# Patient Record
Sex: MALE | Race: OTHER | HISPANIC OR LATINO | URBAN - METROPOLITAN AREA
[De-identification: names, ages, dates, MRNs, and addresses within clinical notes are randomized per-mention and may not be internally consistent; named-entity substitution may affect disease eponyms.]

---

## 2017-07-13 ENCOUNTER — EMERGENCY (EMERGENCY)
Facility: HOSPITAL | Age: 42
LOS: 0 days | Discharge: HOME | End: 2017-07-14

## 2017-07-13 DIAGNOSIS — Z79.899 OTHER LONG TERM (CURRENT) DRUG THERAPY: ICD-10-CM

## 2017-07-13 DIAGNOSIS — R10.32 LEFT LOWER QUADRANT PAIN: ICD-10-CM

## 2017-07-13 DIAGNOSIS — K57.90 DIVERTICULOSIS OF INTESTINE, PART UNSPECIFIED, WITHOUT PERFORATION OR ABSCESS WITHOUT BLEEDING: ICD-10-CM

## 2017-07-13 DIAGNOSIS — E03.9 HYPOTHYROIDISM, UNSPECIFIED: ICD-10-CM

## 2017-08-25 ENCOUNTER — EMERGENCY (EMERGENCY)
Facility: HOSPITAL | Age: 42
LOS: 0 days | Discharge: HOME | End: 2017-08-25
Admitting: INTERNAL MEDICINE

## 2017-08-25 DIAGNOSIS — K57.92 DIVERTICULITIS OF INTESTINE, PART UNSPECIFIED, WITHOUT PERFORATION OR ABSCESS WITHOUT BLEEDING: ICD-10-CM

## 2017-08-25 DIAGNOSIS — R10.30 LOWER ABDOMINAL PAIN, UNSPECIFIED: ICD-10-CM

## 2017-08-25 DIAGNOSIS — E03.9 HYPOTHYROIDISM, UNSPECIFIED: ICD-10-CM

## 2017-08-25 DIAGNOSIS — Z87.891 PERSONAL HISTORY OF NICOTINE DEPENDENCE: ICD-10-CM

## 2017-08-25 DIAGNOSIS — K59.00 CONSTIPATION, UNSPECIFIED: ICD-10-CM

## 2017-08-25 DIAGNOSIS — Z79.899 OTHER LONG TERM (CURRENT) DRUG THERAPY: ICD-10-CM

## 2017-08-25 DIAGNOSIS — R10.32 LEFT LOWER QUADRANT PAIN: ICD-10-CM

## 2018-02-28 ENCOUNTER — EMERGENCY (EMERGENCY)
Facility: HOSPITAL | Age: 43
LOS: 0 days | Discharge: HOME | End: 2018-03-01
Attending: EMERGENCY MEDICINE | Admitting: INTERNAL MEDICINE

## 2018-02-28 VITALS
OXYGEN SATURATION: 96 % | WEIGHT: 210.1 LBS | TEMPERATURE: 101 F | RESPIRATION RATE: 20 BRPM | DIASTOLIC BLOOD PRESSURE: 73 MMHG | SYSTOLIC BLOOD PRESSURE: 129 MMHG | HEART RATE: 120 BPM

## 2018-02-28 DIAGNOSIS — Z79.899 OTHER LONG TERM (CURRENT) DRUG THERAPY: ICD-10-CM

## 2018-02-28 DIAGNOSIS — J20.9 ACUTE BRONCHITIS, UNSPECIFIED: ICD-10-CM

## 2018-02-28 DIAGNOSIS — R50.9 FEVER, UNSPECIFIED: ICD-10-CM

## 2018-02-28 DIAGNOSIS — J11.1 INFLUENZA DUE TO UNIDENTIFIED INFLUENZA VIRUS WITH OTHER RESPIRATORY MANIFESTATIONS: ICD-10-CM

## 2018-02-28 DIAGNOSIS — R51 HEADACHE: ICD-10-CM

## 2018-02-28 DIAGNOSIS — J45.909 UNSPECIFIED ASTHMA, UNCOMPLICATED: ICD-10-CM

## 2018-02-28 DIAGNOSIS — Z79.2 LONG TERM (CURRENT) USE OF ANTIBIOTICS: ICD-10-CM

## 2018-02-28 DIAGNOSIS — Z79.1 LONG TERM (CURRENT) USE OF NON-STEROIDAL ANTI-INFLAMMATORIES (NSAID): ICD-10-CM

## 2018-02-28 RX ORDER — IBUPROFEN 200 MG
1 TABLET ORAL
Qty: 20 | Refills: 0
Start: 2018-02-28 | End: 2018-03-06

## 2018-02-28 RX ORDER — AZITHROMYCIN 500 MG/1
1 TABLET, FILM COATED ORAL
Qty: 6 | Refills: 0
Start: 2018-02-28 | End: 2018-03-04

## 2018-02-28 RX ORDER — IBUPROFEN 200 MG
800 TABLET ORAL ONCE
Qty: 0 | Refills: 0 | Status: COMPLETED | OUTPATIENT
Start: 2018-02-28 | End: 2018-02-28

## 2018-02-28 RX ADMIN — Medication 800 MILLIGRAM(S): at 23:16

## 2018-03-01 VITALS
HEART RATE: 92 BPM | OXYGEN SATURATION: 97 % | SYSTOLIC BLOOD PRESSURE: 122 MMHG | TEMPERATURE: 99 F | DIASTOLIC BLOOD PRESSURE: 75 MMHG | RESPIRATION RATE: 18 BRPM

## 2018-03-01 RX ORDER — ALBUTEROL 90 UG/1
1 AEROSOL, METERED ORAL ONCE
Qty: 0 | Refills: 0 | Status: COMPLETED | OUTPATIENT
Start: 2018-03-01 | End: 2018-03-01

## 2018-03-01 RX ADMIN — ALBUTEROL 1 PUFF(S): 90 AEROSOL, METERED ORAL at 00:27

## 2018-03-01 NOTE — ED PROVIDER NOTE - PROGRESS NOTE DETAILS
well appearing, HR nml now. afebrile. will treat for acute bronchitis and flu Rx sent. ventolin inhaler gven at pts request.

## 2018-03-01 NOTE — ED PROVIDER NOTE - OBJECTIVE STATEMENT
pt c/o 1 day of fever, chills, body aches, fatigue. mild ha. no sore throat. +cough, hurts to cough. no cp or sob. no ab pain, n, v, d.

## 2018-03-01 NOTE — ED PROVIDER NOTE - PHYSICAL EXAMINATION
VITAL SIGNS: I have reviewed nursing notes and confirm.  CONSTITUTIONAL: Well-developed; well-nourished; in no acute distress.  SKIN: Skin exam is warm and dry, no acute rash.  HEAD: Normocephalic; atraumatic.  EYES: PERRL, EOM intact; conjunctiva and sclera clear.  ENT: No nasal discharge; airway clear. TMs clear.  NECK: Supple; non tender.  CARD: S1, S2 normal; no murmurs, gallops, or rubs. Regular rate and rhythm.  RESP: No wheezes, rales or rhonchi.  ABD: Normal bowel sounds; soft; non-distended; non-tender; no hepatosplenomegaly.  EXT: Normal ROM. No clubbing, cyanosis or edema.  LYMPH: No acute cervical adenopathy.  NEURO: Alert, oriented. Grossly unremarkable. No focal deficits.  PSYCH: Cooperative, appropriate.

## 2018-09-20 ENCOUNTER — OUTPATIENT (OUTPATIENT)
Dept: OUTPATIENT SERVICES | Facility: HOSPITAL | Age: 43
LOS: 1 days | Discharge: HOME | End: 2018-09-20

## 2018-09-21 DIAGNOSIS — N40.0 BENIGN PROSTATIC HYPERPLASIA WITHOUT LOWER URINARY TRACT SYMPTOMS: ICD-10-CM

## 2018-09-21 DIAGNOSIS — Z00.00 ENCOUNTER FOR GENERAL ADULT MEDICAL EXAMINATION WITHOUT ABNORMAL FINDINGS: ICD-10-CM

## 2018-09-21 DIAGNOSIS — E78.00 PURE HYPERCHOLESTEROLEMIA, UNSPECIFIED: ICD-10-CM

## 2018-09-21 DIAGNOSIS — D51.0 VITAMIN B12 DEFICIENCY ANEMIA DUE TO INTRINSIC FACTOR DEFICIENCY: ICD-10-CM

## 2018-09-21 PROBLEM — J45.909 UNSPECIFIED ASTHMA, UNCOMPLICATED: Chronic | Status: ACTIVE | Noted: 2018-02-28

## 2018-09-21 PROBLEM — K57.92 DIVERTICULITIS OF INTESTINE, PART UNSPECIFIED, WITHOUT PERFORATION OR ABSCESS WITHOUT BLEEDING: Chronic | Status: ACTIVE | Noted: 2018-02-28

## 2018-09-27 ENCOUNTER — OUTPATIENT (OUTPATIENT)
Dept: OUTPATIENT SERVICES | Facility: HOSPITAL | Age: 43
LOS: 1 days | Discharge: HOME | End: 2018-09-27

## 2018-09-27 DIAGNOSIS — Z13.228 ENCOUNTER FOR SCREENING FOR OTHER METABOLIC DISORDERS: ICD-10-CM

## 2018-11-05 ENCOUNTER — EMERGENCY (EMERGENCY)
Facility: HOSPITAL | Age: 43
LOS: 0 days | Discharge: HOME | End: 2018-11-05
Attending: EMERGENCY MEDICINE | Admitting: EMERGENCY MEDICINE

## 2018-11-05 VITALS
SYSTOLIC BLOOD PRESSURE: 126 MMHG | TEMPERATURE: 99 F | DIASTOLIC BLOOD PRESSURE: 77 MMHG | OXYGEN SATURATION: 100 % | RESPIRATION RATE: 18 BRPM | WEIGHT: 214.95 LBS | HEIGHT: 72 IN | HEART RATE: 70 BPM

## 2018-11-05 VITALS
SYSTOLIC BLOOD PRESSURE: 121 MMHG | TEMPERATURE: 98 F | DIASTOLIC BLOOD PRESSURE: 76 MMHG | HEART RATE: 63 BPM | OXYGEN SATURATION: 100 % | RESPIRATION RATE: 20 BRPM

## 2018-11-05 DIAGNOSIS — Z79.1 LONG TERM (CURRENT) USE OF NON-STEROIDAL ANTI-INFLAMMATORIES (NSAID): ICD-10-CM

## 2018-11-05 DIAGNOSIS — Z79.899 OTHER LONG TERM (CURRENT) DRUG THERAPY: ICD-10-CM

## 2018-11-05 DIAGNOSIS — R10.32 LEFT LOWER QUADRANT PAIN: ICD-10-CM

## 2018-11-05 DIAGNOSIS — J45.909 UNSPECIFIED ASTHMA, UNCOMPLICATED: ICD-10-CM

## 2018-11-05 DIAGNOSIS — K57.92 DIVERTICULITIS OF INTESTINE, PART UNSPECIFIED, WITHOUT PERFORATION OR ABSCESS WITHOUT BLEEDING: ICD-10-CM

## 2018-11-05 DIAGNOSIS — Z79.2 LONG TERM (CURRENT) USE OF ANTIBIOTICS: ICD-10-CM

## 2018-11-05 LAB
ALBUMIN SERPL ELPH-MCNC: 4.3 G/DL — SIGNIFICANT CHANGE UP (ref 3.5–5.2)
ALP SERPL-CCNC: 48 U/L — SIGNIFICANT CHANGE UP (ref 30–115)
ALT FLD-CCNC: 11 U/L — SIGNIFICANT CHANGE UP (ref 0–41)
ANION GAP SERPL CALC-SCNC: 11 MMOL/L — SIGNIFICANT CHANGE UP (ref 7–14)
AST SERPL-CCNC: 11 U/L — SIGNIFICANT CHANGE UP (ref 0–41)
BASOPHILS # BLD AUTO: 0.09 K/UL — SIGNIFICANT CHANGE UP (ref 0–0.2)
BASOPHILS NFR BLD AUTO: 1.6 % — HIGH (ref 0–1)
BILIRUB SERPL-MCNC: 0.4 MG/DL — SIGNIFICANT CHANGE UP (ref 0.2–1.2)
BUN SERPL-MCNC: 12 MG/DL — SIGNIFICANT CHANGE UP (ref 10–20)
CALCIUM SERPL-MCNC: 9.8 MG/DL — SIGNIFICANT CHANGE UP (ref 8.5–10.1)
CHLORIDE SERPL-SCNC: 101 MMOL/L — SIGNIFICANT CHANGE UP (ref 98–110)
CO2 SERPL-SCNC: 28 MMOL/L — SIGNIFICANT CHANGE UP (ref 17–32)
CREAT SERPL-MCNC: 1.1 MG/DL — SIGNIFICANT CHANGE UP (ref 0.7–1.5)
EOSINOPHIL # BLD AUTO: 0.18 K/UL — SIGNIFICANT CHANGE UP (ref 0–0.7)
EOSINOPHIL NFR BLD AUTO: 3.3 % — SIGNIFICANT CHANGE UP (ref 0–8)
GLUCOSE SERPL-MCNC: 98 MG/DL — SIGNIFICANT CHANGE UP (ref 70–99)
HCT VFR BLD CALC: 44.5 % — SIGNIFICANT CHANGE UP (ref 42–52)
HGB BLD-MCNC: 14.9 G/DL — SIGNIFICANT CHANGE UP (ref 14–18)
IMM GRANULOCYTES NFR BLD AUTO: 0.4 % — HIGH (ref 0.1–0.3)
LACTATE SERPL-SCNC: 2.1 MMOL/L — SIGNIFICANT CHANGE UP (ref 0.5–2.2)
LIDOCAIN IGE QN: 20 U/L — SIGNIFICANT CHANGE UP (ref 7–60)
LYMPHOCYTES # BLD AUTO: 1.32 K/UL — SIGNIFICANT CHANGE UP (ref 1.2–3.4)
LYMPHOCYTES # BLD AUTO: 24.1 % — SIGNIFICANT CHANGE UP (ref 20.5–51.1)
MCHC RBC-ENTMCNC: 29.6 PG — SIGNIFICANT CHANGE UP (ref 27–31)
MCHC RBC-ENTMCNC: 33.5 G/DL — SIGNIFICANT CHANGE UP (ref 32–37)
MCV RBC AUTO: 88.3 FL — SIGNIFICANT CHANGE UP (ref 80–94)
MONOCYTES # BLD AUTO: 0.3 K/UL — SIGNIFICANT CHANGE UP (ref 0.1–0.6)
MONOCYTES NFR BLD AUTO: 5.5 % — SIGNIFICANT CHANGE UP (ref 1.7–9.3)
NEUTROPHILS # BLD AUTO: 3.56 K/UL — SIGNIFICANT CHANGE UP (ref 1.4–6.5)
NEUTROPHILS NFR BLD AUTO: 65.1 % — SIGNIFICANT CHANGE UP (ref 42.2–75.2)
PLATELET # BLD AUTO: 251 K/UL — SIGNIFICANT CHANGE UP (ref 130–400)
POTASSIUM SERPL-MCNC: 5.2 MMOL/L — HIGH (ref 3.5–5)
POTASSIUM SERPL-SCNC: 5.2 MMOL/L — HIGH (ref 3.5–5)
PROT SERPL-MCNC: 7.2 G/DL — SIGNIFICANT CHANGE UP (ref 6–8)
RBC # BLD: 5.04 M/UL — SIGNIFICANT CHANGE UP (ref 4.7–6.1)
RBC # FLD: 12.4 % — SIGNIFICANT CHANGE UP (ref 11.5–14.5)
SODIUM SERPL-SCNC: 140 MMOL/L — SIGNIFICANT CHANGE UP (ref 135–146)
WBC # BLD: 5.47 K/UL — SIGNIFICANT CHANGE UP (ref 4.8–10.8)
WBC # FLD AUTO: 5.47 K/UL — SIGNIFICANT CHANGE UP (ref 4.8–10.8)

## 2018-11-05 RX ORDER — SODIUM CHLORIDE 9 MG/ML
1000 INJECTION INTRAMUSCULAR; INTRAVENOUS; SUBCUTANEOUS ONCE
Qty: 0 | Refills: 0 | Status: COMPLETED | OUTPATIENT
Start: 2018-11-05 | End: 2018-11-05

## 2018-11-05 RX ORDER — MORPHINE SULFATE 50 MG/1
4 CAPSULE, EXTENDED RELEASE ORAL ONCE
Qty: 0 | Refills: 0 | Status: DISCONTINUED | OUTPATIENT
Start: 2018-11-05 | End: 2018-11-05

## 2018-11-05 RX ORDER — MOXIFLOXACIN HYDROCHLORIDE TABLETS, 400 MG 400 MG/1
1 TABLET, FILM COATED ORAL
Qty: 10 | Refills: 0
Start: 2018-11-05 | End: 2018-11-14

## 2018-11-05 RX ADMIN — MORPHINE SULFATE 4 MILLIGRAM(S): 50 CAPSULE, EXTENDED RELEASE ORAL at 11:19

## 2018-11-05 RX ADMIN — SODIUM CHLORIDE 2000 MILLILITER(S): 9 INJECTION INTRAMUSCULAR; INTRAVENOUS; SUBCUTANEOUS at 11:20

## 2018-11-05 NOTE — ED PROVIDER NOTE - PHYSICAL EXAMINATION
General: AOx4, Non toxic appearing, NAD, speaking in full sentences.   Skin: Warm and dry, no acute rash.   Head:  Normocephalic, atraumatic.   EENT: PERRLA/EOMI, conjunctiva and sclera clear. MM moist, no nasal discharge.    Neck: Supple nt, no meningeal signs.   Heart RRR s1s2 nl, no rub/murmur.   Lungs- No retractions, BS equal, CTAB.   Abdomen: Soft ttp over LLQ w/ guarding.    Extremities- moves all.  Neuro: Sensation wnl, CN 2-12 grossly intact.   Psych: Cooperative, appropriate

## 2018-11-05 NOTE — ED PROVIDER NOTE - OBJECTIVE STATEMENT
44 y/o M pmh diverticulitis p/w 1 week of worsening crampy LLQ abdominal pain and fullness worse after eating and associated w/ diarrhea. Pt sees Dr Bassett for GI, has been taking cipro/flagyl x 4 days with no improvement in symptoms. Pt denies f/c, n/v, dark or bloody stools.

## 2018-11-05 NOTE — ED PROVIDER NOTE - PROGRESS NOTE DETAILS
Patient doing well. Results d/w pt and questions answered. Copies of all diagnostic tests provided to facilitate outpatient follow up and pulm nodule d/w pt, will see PMD to arrange f/u. Will return to ED if symptoms worsen w/ abx treatment.

## 2018-11-05 NOTE — ED PROVIDER NOTE - ATTENDING CONTRIBUTION TO CARE
43 M past medical history of diverticulitis presents with exacerbation.  On antibx for 7 days with worsening, no improvement.  abd exam benign.  plan is ct and reassess.

## 2018-11-05 NOTE — ED PROVIDER NOTE - NS ED ROS FT
Constitutional: NAD  Eyes: No visual changes, eye pain or discharge.  ENMT: No hearing changes, pain, discharge or infections. No neck pain or stiffness.  Cardiac: No chest pain, SOB or edema. No chest pain with exertion.  Respiratory: No cough or respiratory distress.   GI: No nausea, vomiting. +diarrhea, abdominal pain.  : No dysuria, frequency or burning.  MS: No myalgia, muscle weakness, joint pain or back pain.  Neuro: No headache or weakness. No LOC.  Skin: No skin rash.  Heme: No bruising

## 2019-02-25 ENCOUNTER — EMERGENCY (EMERGENCY)
Facility: HOSPITAL | Age: 44
LOS: 0 days | Discharge: HOME | End: 2019-02-25
Admitting: EMERGENCY MEDICINE

## 2019-02-25 VITALS
WEIGHT: 205.03 LBS | DIASTOLIC BLOOD PRESSURE: 87 MMHG | HEIGHT: 72 IN | HEART RATE: 88 BPM | SYSTOLIC BLOOD PRESSURE: 132 MMHG | OXYGEN SATURATION: 99 % | TEMPERATURE: 99 F | RESPIRATION RATE: 20 BRPM

## 2019-02-25 DIAGNOSIS — J45.909 UNSPECIFIED ASTHMA, UNCOMPLICATED: ICD-10-CM

## 2019-02-25 DIAGNOSIS — Z79.1 LONG TERM (CURRENT) USE OF NON-STEROIDAL ANTI-INFLAMMATORIES (NSAID): ICD-10-CM

## 2019-02-25 DIAGNOSIS — Z79.899 OTHER LONG TERM (CURRENT) DRUG THERAPY: ICD-10-CM

## 2019-02-25 DIAGNOSIS — F17.200 NICOTINE DEPENDENCE, UNSPECIFIED, UNCOMPLICATED: ICD-10-CM

## 2019-02-25 DIAGNOSIS — J02.9 ACUTE PHARYNGITIS, UNSPECIFIED: ICD-10-CM

## 2019-02-25 DIAGNOSIS — Z79.2 LONG TERM (CURRENT) USE OF ANTIBIOTICS: ICD-10-CM

## 2019-02-25 DIAGNOSIS — R05 COUGH: ICD-10-CM

## 2019-02-25 NOTE — ED PROVIDER NOTE - SKIN, MLM
+ 0.8 x 1cm abrasion along right anterior neck ; remainder of visualized skin normal color for race, warm, dry and intact. No evidence of rash. Skin normal color for race, warm, dry and intact. No evidence of rash.

## 2019-02-25 NOTE — ED PROVIDER NOTE - OBJECTIVE STATEMENT
42 y/o M, PMHx Asthma, presents to the ED with complaints of an asthma exacerbation today. Patient is currently under [facility] supervision and states that he was running from the police and his asthma 'acted up.' He feels much improved at present; denies any chest pain, dyspnea, fever, chills, recent illness, recent known sick contacts, and recent travel. He is a daily cigarette smoker. 44 y/o M, no significant PMHx, presents to the ED with complaints of a cough x two weeks. He admits to having been with nasal congestion and odynophagia; denies any fever, chills, chest pain, dyspnea, nausea, vomiting, recent travel and recent known sick contacts. He is an occasional marijuana smoker. He went to his PMD, Dr. Lopez, five days ago and was given a Rx for Augmentin which he took x 5 days then discontinued as he did not feel much improved.

## 2019-02-25 NOTE — ED PROVIDER NOTE - ENMT NEGATIVE STATEMENT, MLM
Ears: no ear pain and no hearing problems.Nose: + nasal congestion and no nasal drainage.Mouth/Throat: + sore-throat; no dysphagia, no hoarseness.Neck: no lumps, no pain, no stiffness and no swollen glands.

## 2019-02-25 NOTE — ED PROVIDER NOTE - CARE PROVIDER_API CALL
Vladimir Lopez)  Internal Medicine  [address] [place] [address]  Phone: [phone]  Fax: [phone]  Follow Up Time:

## 2019-02-25 NOTE — ED ADULT NURSE NOTE - OBJECTIVE STATEMENT
pt presents to ED c/o cough and nasal congestion x 2 weeks. Pt denies fevers or chills, CP, SOB. Pt was seen by PMD and was started on Augmentin.

## 2019-02-25 NOTE — ED PROVIDER NOTE - CARE PROVIDERS DIRECT ADDRESSES
[email or web address].[facility] Islandirect.[facility]

## 2019-02-25 NOTE — ED PROVIDER NOTE - CARE PLAN
Principal Discharge DX:	Cough  Secondary Diagnosis:	Nasal congestion  Secondary Diagnosis:	Sore throat

## 2019-08-07 ENCOUNTER — EMERGENCY (EMERGENCY)
Facility: HOSPITAL | Age: 44
LOS: 0 days | Discharge: HOME | End: 2019-08-08
Attending: EMERGENCY MEDICINE | Admitting: EMERGENCY MEDICINE
Payer: COMMERCIAL

## 2019-08-07 VITALS
SYSTOLIC BLOOD PRESSURE: 121 MMHG | HEART RATE: 75 BPM | TEMPERATURE: 98 F | RESPIRATION RATE: 18 BRPM | OXYGEN SATURATION: 99 % | DIASTOLIC BLOOD PRESSURE: 84 MMHG

## 2019-08-07 DIAGNOSIS — K57.92 DIVERTICULITIS OF INTESTINE, PART UNSPECIFIED, WITHOUT PERFORATION OR ABSCESS WITHOUT BLEEDING: ICD-10-CM

## 2019-08-07 DIAGNOSIS — R10.9 UNSPECIFIED ABDOMINAL PAIN: ICD-10-CM

## 2019-08-07 PROCEDURE — 71045 X-RAY EXAM CHEST 1 VIEW: CPT | Mod: 26

## 2019-08-07 PROCEDURE — 93010 ELECTROCARDIOGRAM REPORT: CPT

## 2019-08-07 PROCEDURE — 99285 EMERGENCY DEPT VISIT HI MDM: CPT

## 2019-08-07 RX ORDER — ONDANSETRON 8 MG/1
4 TABLET, FILM COATED ORAL ONCE
Refills: 0 | Status: COMPLETED | OUTPATIENT
Start: 2019-08-07 | End: 2019-08-07

## 2019-08-07 RX ADMIN — ONDANSETRON 4 MILLIGRAM(S): 8 TABLET, FILM COATED ORAL at 23:24

## 2019-08-08 VITALS
SYSTOLIC BLOOD PRESSURE: 122 MMHG | DIASTOLIC BLOOD PRESSURE: 79 MMHG | RESPIRATION RATE: 17 BRPM | HEART RATE: 71 BPM | TEMPERATURE: 98 F | OXYGEN SATURATION: 99 %

## 2019-08-08 LAB
ALBUMIN SERPL ELPH-MCNC: 4.7 G/DL — SIGNIFICANT CHANGE UP (ref 3.5–5.2)
ALP SERPL-CCNC: 62 U/L — SIGNIFICANT CHANGE UP (ref 30–115)
ALT FLD-CCNC: 26 U/L — SIGNIFICANT CHANGE UP (ref 0–41)
ANION GAP SERPL CALC-SCNC: 12 MMOL/L — SIGNIFICANT CHANGE UP (ref 7–14)
APPEARANCE UR: CLEAR — SIGNIFICANT CHANGE UP
AST SERPL-CCNC: 23 U/L — SIGNIFICANT CHANGE UP (ref 0–41)
BASOPHILS # BLD AUTO: 0.09 K/UL — SIGNIFICANT CHANGE UP (ref 0–0.2)
BASOPHILS NFR BLD AUTO: 1.1 % — HIGH (ref 0–1)
BILIRUB DIRECT SERPL-MCNC: <0.2 MG/DL — SIGNIFICANT CHANGE UP (ref 0–0.2)
BILIRUB INDIRECT FLD-MCNC: >0.2 MG/DL — SIGNIFICANT CHANGE UP (ref 0.2–1.2)
BILIRUB SERPL-MCNC: 0.4 MG/DL — SIGNIFICANT CHANGE UP (ref 0.2–1.2)
BILIRUB SERPL-MCNC: 0.4 MG/DL — SIGNIFICANT CHANGE UP (ref 0.2–1.2)
BILIRUB UR-MCNC: NEGATIVE — SIGNIFICANT CHANGE UP
BUN SERPL-MCNC: 13 MG/DL — SIGNIFICANT CHANGE UP (ref 10–20)
CALCIUM SERPL-MCNC: 9.9 MG/DL — SIGNIFICANT CHANGE UP (ref 8.5–10.1)
CHLORIDE SERPL-SCNC: 102 MMOL/L — SIGNIFICANT CHANGE UP (ref 98–110)
CO2 SERPL-SCNC: 28 MMOL/L — SIGNIFICANT CHANGE UP (ref 17–32)
COLOR SPEC: SIGNIFICANT CHANGE UP
CREAT SERPL-MCNC: 1 MG/DL — SIGNIFICANT CHANGE UP (ref 0.7–1.5)
DIFF PNL FLD: NEGATIVE — SIGNIFICANT CHANGE UP
EOSINOPHIL # BLD AUTO: 0.18 K/UL — SIGNIFICANT CHANGE UP (ref 0–0.7)
EOSINOPHIL NFR BLD AUTO: 2.3 % — SIGNIFICANT CHANGE UP (ref 0–8)
GLUCOSE SERPL-MCNC: 95 MG/DL — SIGNIFICANT CHANGE UP (ref 70–99)
GLUCOSE UR QL: NEGATIVE — SIGNIFICANT CHANGE UP
HCT VFR BLD CALC: 47.5 % — SIGNIFICANT CHANGE UP (ref 42–52)
HGB BLD-MCNC: 16.5 G/DL — SIGNIFICANT CHANGE UP (ref 14–18)
IMM GRANULOCYTES NFR BLD AUTO: 0.4 % — HIGH (ref 0.1–0.3)
KETONES UR-MCNC: NEGATIVE — SIGNIFICANT CHANGE UP
LACTATE SERPL-SCNC: 2.4 MMOL/L — HIGH (ref 0.5–2.2)
LEUKOCYTE ESTERASE UR-ACNC: NEGATIVE — SIGNIFICANT CHANGE UP
LIDOCAIN IGE QN: 30 U/L — SIGNIFICANT CHANGE UP (ref 7–60)
LYMPHOCYTES # BLD AUTO: 2.34 K/UL — SIGNIFICANT CHANGE UP (ref 1.2–3.4)
LYMPHOCYTES # BLD AUTO: 29.6 % — SIGNIFICANT CHANGE UP (ref 20.5–51.1)
MCHC RBC-ENTMCNC: 30.6 PG — SIGNIFICANT CHANGE UP (ref 27–31)
MCHC RBC-ENTMCNC: 34.7 G/DL — SIGNIFICANT CHANGE UP (ref 32–37)
MCV RBC AUTO: 88 FL — SIGNIFICANT CHANGE UP (ref 80–94)
MONOCYTES # BLD AUTO: 0.53 K/UL — SIGNIFICANT CHANGE UP (ref 0.1–0.6)
MONOCYTES NFR BLD AUTO: 6.7 % — SIGNIFICANT CHANGE UP (ref 1.7–9.3)
NEUTROPHILS # BLD AUTO: 4.74 K/UL — SIGNIFICANT CHANGE UP (ref 1.4–6.5)
NEUTROPHILS NFR BLD AUTO: 59.9 % — SIGNIFICANT CHANGE UP (ref 42.2–75.2)
NITRITE UR-MCNC: NEGATIVE — SIGNIFICANT CHANGE UP
NRBC # BLD: 0 /100 WBCS — SIGNIFICANT CHANGE UP (ref 0–0)
PH UR: 6 — SIGNIFICANT CHANGE UP (ref 5–8)
PLATELET # BLD AUTO: 291 K/UL — SIGNIFICANT CHANGE UP (ref 130–400)
POTASSIUM SERPL-MCNC: 4.6 MMOL/L — SIGNIFICANT CHANGE UP (ref 3.5–5)
POTASSIUM SERPL-SCNC: 4.6 MMOL/L — SIGNIFICANT CHANGE UP (ref 3.5–5)
PROT SERPL-MCNC: 7.9 G/DL — SIGNIFICANT CHANGE UP (ref 6–8)
PROT UR-MCNC: NEGATIVE — SIGNIFICANT CHANGE UP
RBC # BLD: 5.4 M/UL — SIGNIFICANT CHANGE UP (ref 4.7–6.1)
RBC # FLD: 12.1 % — SIGNIFICANT CHANGE UP (ref 11.5–14.5)
SODIUM SERPL-SCNC: 142 MMOL/L — SIGNIFICANT CHANGE UP (ref 135–146)
SP GR SPEC: 1.02 — SIGNIFICANT CHANGE UP (ref 1.01–1.02)
TROPONIN T SERPL-MCNC: <0.01 NG/ML — SIGNIFICANT CHANGE UP
UROBILINOGEN FLD QL: SIGNIFICANT CHANGE UP
WBC # BLD: 7.91 K/UL — SIGNIFICANT CHANGE UP (ref 4.8–10.8)
WBC # FLD AUTO: 7.91 K/UL — SIGNIFICANT CHANGE UP (ref 4.8–10.8)

## 2019-08-08 PROCEDURE — 74177 CT ABD & PELVIS W/CONTRAST: CPT | Mod: 26

## 2019-08-08 RX ORDER — METRONIDAZOLE 500 MG
500 TABLET ORAL ONCE
Refills: 0 | Status: COMPLETED | OUTPATIENT
Start: 2019-08-08 | End: 2019-08-08

## 2019-08-08 RX ORDER — SODIUM CHLORIDE 9 MG/ML
1000 INJECTION, SOLUTION INTRAVENOUS ONCE
Refills: 0 | Status: COMPLETED | OUTPATIENT
Start: 2019-08-08 | End: 2019-08-08

## 2019-08-08 RX ORDER — CIPROFLOXACIN LACTATE 400MG/40ML
400 VIAL (ML) INTRAVENOUS ONCE
Refills: 0 | Status: COMPLETED | OUTPATIENT
Start: 2019-08-08 | End: 2019-08-08

## 2019-08-08 RX ORDER — METRONIDAZOLE 500 MG
1 TABLET ORAL
Qty: 30 | Refills: 0
Start: 2019-08-08 | End: 2019-08-17

## 2019-08-08 RX ORDER — MOXIFLOXACIN HYDROCHLORIDE TABLETS, 400 MG 400 MG/1
1 TABLET, FILM COATED ORAL
Qty: 20 | Refills: 0
Start: 2019-08-08 | End: 2019-08-17

## 2019-08-08 RX ADMIN — SODIUM CHLORIDE 1000 MILLILITER(S): 9 INJECTION, SOLUTION INTRAVENOUS at 03:23

## 2019-08-08 RX ADMIN — Medication 100 MILLIGRAM(S): at 03:39

## 2019-08-08 RX ADMIN — Medication 200 MILLIGRAM(S): at 03:08

## 2019-08-08 NOTE — ED PROVIDER NOTE - PHYSICAL EXAMINATION
VITAL SIGNS: I have reviewed nursing notes and confirm.  CONSTITUTIONAL: Well-developed; well-nourished; in no acute distress.  SKIN: Skin exam is warm and dry, no acute rash.  HEAD: Normocephalic; atraumatic.  EYES: PERRL, EOM intact; conjunctiva and sclera clear.  ENT: No nasal discharge; airway clear.  NECK: Supple; non tender.  CARD: S1, S2 normal; no murmurs, gallops, or rubs. Regular rate and rhythm.  RESP: No wheezes, rales or rhonchi.  ABD: Normal bowel sounds; soft; non-distended; mild LLQ ttp; no hepatosplenomegaly.  BACK: non-tender, no CVAT  EXT: Normal ROM. No clubbing, cyanosis or edema. DPI.  NEURO: Alert, oriented. Grossly unremarkable. No focal deficits.  PSYCH: Cooperative, appropriate.

## 2019-08-08 NOTE — ED PROVIDER NOTE - PROVIDER TOKENS
PROVIDER:[TOKEN:[[id number]]],PROVIDER:[TOKEN:[[id number]]],FREE:[LAST:[Serjio],PHONE:[(   )    -],FAX:[(   )    -],ADDRESS:[your private Gastroenterology Dr. Bassett]]

## 2019-08-08 NOTE — ED PROVIDER NOTE - CARE PROVIDERS DIRECT ADDRESSES
[email or web address] [address].[email or web address],DirectAddress_Unknown,DirectAddress_Unknown

## 2019-08-08 NOTE — ED PROVIDER NOTE - CLINICAL SUMMARY MEDICAL DECISION MAKING FREE TEXT BOX
mild diverticulitis - iv abx given, pt has 5 days left of 10d course cipro/flagyl prescribed by his PCP - all results d/w pt & copies given, strict return precautions discussed, encouraged close outpt f/u with PMD [facility] & GI Dr. Bassett

## 2019-08-08 NOTE — ED PROVIDER NOTE - CARE PROVIDER_API CALL
Vladimir Lopez)  Internal Medicine  [address] [place] [address]  Phone: [phone]  Fax: [phone]  Follow Up Time:     Munir Mohamud)  Cardiovascular Disease; Interventional Cardiology  [address] [place] [address]  Phone: [phone]  Fax: [phone]  Follow Up Time:     jose Bassett private Gastroenterology Dr. Bassett  Phone: (   )    -  Fax: (   )    -  Follow Up Time:

## 2019-08-08 NOTE — ED PROVIDER NOTE - OBJECTIVE STATEMENT
44y m h/o frequent diverticulitis no surgical interventions p/w LLQ pain x 1 week. Intermitt sharp pain, occ radiating to SP region, accomp by nausea & diarrhea. Started rx for cipro/flagyl 5d ago given to him by his GI Dr. Bassett, has 5d left. Today was taking bus to work around 10am when he felt a sudden episode of lower abd cramping, accomp by lightheadedness & diaphoresis. Resolved spontaneously but still felt “off” for rest of day while @ work so came to ED. Denies f/c, cp/sob, nv, flank pain, urinary sx, rash.

## 2019-08-09 LAB
CULTURE RESULTS: NO GROWTH — SIGNIFICANT CHANGE UP
SPECIMEN SOURCE: SIGNIFICANT CHANGE UP

## 2019-08-15 PROBLEM — Z00.00 ENCOUNTER FOR PREVENTIVE HEALTH EXAMINATION: Status: ACTIVE | Noted: 2019-08-15

## 2019-08-19 ENCOUNTER — INPATIENT (INPATIENT)
Facility: HOSPITAL | Age: 44
LOS: 2 days | Discharge: HOME HEALTH | End: 2019-08-22
Attending: INTERNAL MEDICINE | Admitting: INTERNAL MEDICINE
Payer: COMMERCIAL

## 2019-08-19 VITALS
OXYGEN SATURATION: 99 % | DIASTOLIC BLOOD PRESSURE: 68 MMHG | HEART RATE: 77 BPM | SYSTOLIC BLOOD PRESSURE: 131 MMHG | TEMPERATURE: 99 F | RESPIRATION RATE: 18 BRPM

## 2019-08-19 DIAGNOSIS — N14.1 NEPHROPATHY INDUCED BY OTHER DRUGS, MEDICAMENTS AND BIOLOGICAL SUBSTANCES: ICD-10-CM

## 2019-08-19 LAB
ALBUMIN SERPL ELPH-MCNC: 5.1 G/DL — SIGNIFICANT CHANGE UP (ref 3.5–5.2)
ALP SERPL-CCNC: 56 U/L — SIGNIFICANT CHANGE UP (ref 30–115)
ALT FLD-CCNC: 12 U/L — SIGNIFICANT CHANGE UP (ref 0–41)
ANION GAP SERPL CALC-SCNC: 11 MMOL/L — SIGNIFICANT CHANGE UP (ref 7–14)
APPEARANCE UR: CLEAR — SIGNIFICANT CHANGE UP
AST SERPL-CCNC: 13 U/L — SIGNIFICANT CHANGE UP (ref 0–41)
BASE EXCESS BLDV CALC-SCNC: 2.1 MMOL/L — HIGH (ref -2–2)
BASOPHILS # BLD AUTO: 0.07 K/UL — SIGNIFICANT CHANGE UP (ref 0–0.2)
BASOPHILS NFR BLD AUTO: 0.9 % — SIGNIFICANT CHANGE UP (ref 0–1)
BILIRUB SERPL-MCNC: 0.8 MG/DL — SIGNIFICANT CHANGE UP (ref 0.2–1.2)
BILIRUB UR-MCNC: NEGATIVE — SIGNIFICANT CHANGE UP
BUN SERPL-MCNC: 13 MG/DL — SIGNIFICANT CHANGE UP (ref 10–20)
CA-I SERPL-SCNC: 1.21 MMOL/L — SIGNIFICANT CHANGE UP (ref 1.12–1.3)
CALCIUM SERPL-MCNC: 9.7 MG/DL — SIGNIFICANT CHANGE UP (ref 8.5–10.1)
CHLORIDE SERPL-SCNC: 102 MMOL/L — SIGNIFICANT CHANGE UP (ref 98–110)
CO2 SERPL-SCNC: 28 MMOL/L — SIGNIFICANT CHANGE UP (ref 17–32)
COLOR SPEC: YELLOW — SIGNIFICANT CHANGE UP
CREAT SERPL-MCNC: 1.7 MG/DL — HIGH (ref 0.7–1.5)
DIFF PNL FLD: NEGATIVE — SIGNIFICANT CHANGE UP
EOSINOPHIL # BLD AUTO: 0.03 K/UL — SIGNIFICANT CHANGE UP (ref 0–0.7)
EOSINOPHIL NFR BLD AUTO: 0.4 % — SIGNIFICANT CHANGE UP (ref 0–8)
GAS PNL BLDV: 141 MMOL/L — SIGNIFICANT CHANGE UP (ref 136–145)
GAS PNL BLDV: SIGNIFICANT CHANGE UP
GLUCOSE SERPL-MCNC: 100 MG/DL — HIGH (ref 70–99)
GLUCOSE UR QL: NEGATIVE — SIGNIFICANT CHANGE UP
HCO3 BLDV-SCNC: 28 MMOL/L — SIGNIFICANT CHANGE UP (ref 22–29)
HCT VFR BLD CALC: 47.3 % — SIGNIFICANT CHANGE UP (ref 42–52)
HCT VFR BLDA CALC: 49.1 % — HIGH (ref 34–44)
HGB BLD CALC-MCNC: 16 G/DL — SIGNIFICANT CHANGE UP (ref 14–18)
HGB BLD-MCNC: 16.3 G/DL — SIGNIFICANT CHANGE UP (ref 14–18)
IMM GRANULOCYTES NFR BLD AUTO: 0.4 % — HIGH (ref 0.1–0.3)
KETONES UR-MCNC: NEGATIVE — SIGNIFICANT CHANGE UP
LACTATE BLDV-MCNC: 0.7 MMOL/L — SIGNIFICANT CHANGE UP (ref 0.5–1.6)
LACTATE SERPL-SCNC: 0.8 MMOL/L — SIGNIFICANT CHANGE UP (ref 0.5–2.2)
LEUKOCYTE ESTERASE UR-ACNC: NEGATIVE — SIGNIFICANT CHANGE UP
LIDOCAIN IGE QN: 19 U/L — SIGNIFICANT CHANGE UP (ref 7–60)
LYMPHOCYTES # BLD AUTO: 1.24 K/UL — SIGNIFICANT CHANGE UP (ref 1.2–3.4)
LYMPHOCYTES # BLD AUTO: 15.6 % — LOW (ref 20.5–51.1)
MCHC RBC-ENTMCNC: 30.1 PG — SIGNIFICANT CHANGE UP (ref 27–31)
MCHC RBC-ENTMCNC: 34.5 G/DL — SIGNIFICANT CHANGE UP (ref 32–37)
MCV RBC AUTO: 87.3 FL — SIGNIFICANT CHANGE UP (ref 80–94)
MONOCYTES # BLD AUTO: 0.36 K/UL — SIGNIFICANT CHANGE UP (ref 0.1–0.6)
MONOCYTES NFR BLD AUTO: 4.5 % — SIGNIFICANT CHANGE UP (ref 1.7–9.3)
NEUTROPHILS # BLD AUTO: 6.2 K/UL — SIGNIFICANT CHANGE UP (ref 1.4–6.5)
NEUTROPHILS NFR BLD AUTO: 78.2 % — HIGH (ref 42.2–75.2)
NITRITE UR-MCNC: NEGATIVE — SIGNIFICANT CHANGE UP
NRBC # BLD: 0 /100 WBCS — SIGNIFICANT CHANGE UP (ref 0–0)
PCO2 BLDV: 49 MMHG — SIGNIFICANT CHANGE UP (ref 41–51)
PH BLDV: 7.37 — SIGNIFICANT CHANGE UP (ref 7.26–7.43)
PH UR: 7 — SIGNIFICANT CHANGE UP (ref 5–8)
PLATELET # BLD AUTO: 297 K/UL — SIGNIFICANT CHANGE UP (ref 130–400)
PO2 BLDV: 31 MMHG — SIGNIFICANT CHANGE UP (ref 20–40)
POTASSIUM BLDV-SCNC: 3.9 MMOL/L — SIGNIFICANT CHANGE UP (ref 3.3–5.6)
POTASSIUM SERPL-MCNC: 4.5 MMOL/L — SIGNIFICANT CHANGE UP (ref 3.5–5)
POTASSIUM SERPL-SCNC: 4.5 MMOL/L — SIGNIFICANT CHANGE UP (ref 3.5–5)
PROT SERPL-MCNC: 7.8 G/DL — SIGNIFICANT CHANGE UP (ref 6–8)
PROT UR-MCNC: SIGNIFICANT CHANGE UP
RBC # BLD: 5.42 M/UL — SIGNIFICANT CHANGE UP (ref 4.7–6.1)
RBC # FLD: 11.9 % — SIGNIFICANT CHANGE UP (ref 11.5–14.5)
SAO2 % BLDV: 56 % — SIGNIFICANT CHANGE UP
SODIUM SERPL-SCNC: 141 MMOL/L — SIGNIFICANT CHANGE UP (ref 135–146)
SP GR SPEC: 1.01 — SIGNIFICANT CHANGE UP (ref 1.01–1.02)
UROBILINOGEN FLD QL: SIGNIFICANT CHANGE UP
WBC # BLD: 7.93 K/UL — SIGNIFICANT CHANGE UP (ref 4.8–10.8)
WBC # FLD AUTO: 7.93 K/UL — SIGNIFICANT CHANGE UP (ref 4.8–10.8)

## 2019-08-19 PROCEDURE — 74177 CT ABD & PELVIS W/CONTRAST: CPT | Mod: 26

## 2019-08-19 PROCEDURE — 99285 EMERGENCY DEPT VISIT HI MDM: CPT

## 2019-08-19 RX ORDER — HEPARIN SODIUM 5000 [USP'U]/ML
5000 INJECTION INTRAVENOUS; SUBCUTANEOUS EVERY 8 HOURS
Refills: 0 | Status: DISCONTINUED | OUTPATIENT
Start: 2019-08-19 | End: 2019-08-22

## 2019-08-19 RX ORDER — SODIUM CHLORIDE 9 MG/ML
1000 INJECTION INTRAMUSCULAR; INTRAVENOUS; SUBCUTANEOUS ONCE
Refills: 0 | Status: COMPLETED | OUTPATIENT
Start: 2019-08-19 | End: 2019-08-19

## 2019-08-19 RX ORDER — CEFTRIAXONE 500 MG/1
1000 INJECTION, POWDER, FOR SOLUTION INTRAMUSCULAR; INTRAVENOUS ONCE
Refills: 0 | Status: COMPLETED | OUTPATIENT
Start: 2019-08-19 | End: 2019-08-19

## 2019-08-19 RX ORDER — METRONIDAZOLE 500 MG
500 TABLET ORAL EVERY 8 HOURS
Refills: 0 | Status: DISCONTINUED | OUTPATIENT
Start: 2019-08-19 | End: 2019-08-21

## 2019-08-19 RX ORDER — CHLORHEXIDINE GLUCONATE 213 G/1000ML
1 SOLUTION TOPICAL
Refills: 0 | Status: DISCONTINUED | OUTPATIENT
Start: 2019-08-19 | End: 2019-08-22

## 2019-08-19 RX ORDER — MORPHINE SULFATE 50 MG/1
4 CAPSULE, EXTENDED RELEASE ORAL ONCE
Refills: 0 | Status: DISCONTINUED | OUTPATIENT
Start: 2019-08-19 | End: 2019-08-19

## 2019-08-19 RX ORDER — SODIUM CHLORIDE 9 MG/ML
1000 INJECTION, SOLUTION INTRAVENOUS ONCE
Refills: 0 | Status: COMPLETED | OUTPATIENT
Start: 2019-08-19 | End: 2019-08-19

## 2019-08-19 RX ORDER — METRONIDAZOLE 500 MG
500 TABLET ORAL ONCE
Refills: 0 | Status: COMPLETED | OUTPATIENT
Start: 2019-08-19 | End: 2019-08-19

## 2019-08-19 RX ORDER — ONDANSETRON 8 MG/1
4 TABLET, FILM COATED ORAL ONCE
Refills: 0 | Status: COMPLETED | OUTPATIENT
Start: 2019-08-19 | End: 2019-08-19

## 2019-08-19 RX ORDER — CEFTRIAXONE 500 MG/1
1000 INJECTION, POWDER, FOR SOLUTION INTRAMUSCULAR; INTRAVENOUS EVERY 24 HOURS
Refills: 0 | Status: DISCONTINUED | OUTPATIENT
Start: 2019-08-19 | End: 2019-08-21

## 2019-08-19 RX ADMIN — SODIUM CHLORIDE 1000 MILLILITER(S): 9 INJECTION, SOLUTION INTRAVENOUS at 19:26

## 2019-08-19 RX ADMIN — SODIUM CHLORIDE 1000 MILLILITER(S): 9 INJECTION INTRAMUSCULAR; INTRAVENOUS; SUBCUTANEOUS at 21:59

## 2019-08-19 RX ADMIN — CEFTRIAXONE 100 MILLIGRAM(S): 500 INJECTION, POWDER, FOR SOLUTION INTRAMUSCULAR; INTRAVENOUS at 23:50

## 2019-08-19 RX ADMIN — SODIUM CHLORIDE 2000 MILLILITER(S): 9 INJECTION INTRAMUSCULAR; INTRAVENOUS; SUBCUTANEOUS at 17:13

## 2019-08-19 RX ADMIN — SODIUM CHLORIDE 1000 MILLILITER(S): 9 INJECTION, SOLUTION INTRAVENOUS at 19:04

## 2019-08-19 RX ADMIN — Medication 100 MILLIGRAM(S): at 23:50

## 2019-08-19 RX ADMIN — ONDANSETRON 4 MILLIGRAM(S): 8 TABLET, FILM COATED ORAL at 17:13

## 2019-08-19 NOTE — ED PROVIDER NOTE - OBJECTIVE STATEMENT
43 y/o M PMH of previous bouts of diverticulitis, p/w 2 weeks of fatigue, nausea and worsening abdominal pain assoc with episodes of sweats and lightheadedness Since an ED visit for similar abdominal pain 11 days ago, less severe at that time . Currently on last day cipro/flagyl. No vomiting, diarrhea, black/bloody stools, dysuria or hematuria. admits to taking motrin at home twice 800 mg over past week.

## 2019-08-19 NOTE — ED PROVIDER NOTE - PROGRESS NOTE DETAILS
ATTENDING NOTE: Dr. Ames  45 y/o M PMH multiple episodes of diverticulitis, presenting with 2 weeks of fatigue, nausea and increasing abdominal pain with episodes of sweats and near syncope. Since an ED visit for similar abdominal pain which was less severe finding diverticulitis 11 days ago. Currently on last day of ABX. No vomiting, diarrhea, black/bloody stools, dysuria or hematuria. Exam: NAD, NCAT, HEENT: mmm, EOMI, PERRLA, Neck: supple, nontender, nl ROM, Heart: RRR, no murmur, Extremities 2+ b/l pulses intact. Lungs: BCTA, no signs of increased WOB, Abd: (+) TTP to LLQ, ND, no guarding or rebound, no hernia palpated, no CVAT. MSK: chest, back, and ext nontender, nl rom, no deformity. Neuro: A&Ox3, normal strength, nl sensation throughout, normal speech. A/P: Will evaluation for complications of diverticulitis possible focal perforation, sepsis, vs urinary pathology. patient in no acute distress, will reassessed. OF NOTE: CIPROFLOXACIN HAS <1% RISK FOR CAUSING INTERSTITIAL NEPHRITIS. No other recent causative medicines OTC or Rx have been taken to explain symptoms. Communicated to inpt team.

## 2019-08-19 NOTE — ED PROVIDER NOTE - PHYSICAL EXAMINATION
Vital Signs: I have reviewed the initial vital signs.  Constitutional: well-nourished, appears stated age, no acute distress  Cardiovascular: regular rate, regular rhythm, well-perfused extremities  Respiratory: unlabored respiratory effort, clear to auscultation bilaterally  Gastrointestinal: soft, LLQ abdominal ttp, no pulsatile mass  Musculoskeletal: supple neck, no lower extremity edema  Integumentary: warm, dry, no rash  Neurologic: awake, alert, extremities’ motor and sensory functions grossly intact  Psychiatric: appropriate mood, appropriate affect

## 2019-08-19 NOTE — ED PROVIDER NOTE - CLINICAL SUMMARY MEDICAL DECISION MAKING FREE TEXT BOX
pw continued LLQ abd pain. found to have persistent diverticulitis and interstitial nephritis with FERNANDO. Abx, fluids. Patient to be admitted to an inpatient floor. Case discussed with and care endorsed to medical admitting resident. Admitting physician notified.

## 2019-08-19 NOTE — ED PROVIDER NOTE - NS ED ROS FT
Constitutional: See HPI.  Eyes: No visual changes, eye pain or discharge. No Photophobia  ENMT: No hearing changes, pain, discharge or infections. No neck pain or stiffness. No limited ROM  Cardiac: No SOB or edema. No chest pain with exertion.  Respiratory: No cough or respiratory distress. No hemoptysis. No history of asthma or RAD.  GI: + abdominal pain.+ nausea. no vomiting, diarrhea   : No dysuria, frequency or burning. No Discharge  MS: No myalgia, muscle weakness, joint pain or back pain.  Neuro: No headache or weakness. No LOC.  Skin: No skin rash.  Except as documented in the HPI, all other systems are negative.

## 2019-08-19 NOTE — ED ADULT NURSE REASSESSMENT NOTE - NS ED NURSE REASSESS COMMENT FT1
patient refused re-peat vital signs as per protocol in waiting room/triage , patient reassured and is waiting to be seen in assigned area in main ED, will continue to monitor.

## 2019-08-19 NOTE — ED ADULT NURSE REASSESSMENT NOTE - NS ED NURSE REASSESS COMMENT FT1
patient re-assessed in waiting room, vitals signs re-checked and recorded, no change in status since initial arrival to ed.

## 2019-08-20 ENCOUNTER — TRANSCRIPTION ENCOUNTER (OUTPATIENT)
Age: 44
End: 2019-08-20

## 2019-08-20 LAB
ALBUMIN SERPL ELPH-MCNC: 4.4 G/DL — SIGNIFICANT CHANGE UP (ref 3.5–5.2)
ALP SERPL-CCNC: 49 U/L — SIGNIFICANT CHANGE UP (ref 30–115)
ALT FLD-CCNC: 11 U/L — SIGNIFICANT CHANGE UP (ref 0–41)
ANION GAP SERPL CALC-SCNC: 13 MMOL/L — SIGNIFICANT CHANGE UP (ref 7–14)
AST SERPL-CCNC: 12 U/L — SIGNIFICANT CHANGE UP (ref 0–41)
BILIRUB SERPL-MCNC: 0.7 MG/DL — SIGNIFICANT CHANGE UP (ref 0.2–1.2)
BUN SERPL-MCNC: 11 MG/DL — SIGNIFICANT CHANGE UP (ref 10–20)
CALCIUM SERPL-MCNC: 9.4 MG/DL — SIGNIFICANT CHANGE UP (ref 8.5–10.1)
CHLORIDE SERPL-SCNC: 104 MMOL/L — SIGNIFICANT CHANGE UP (ref 98–110)
CO2 SERPL-SCNC: 25 MMOL/L — SIGNIFICANT CHANGE UP (ref 17–32)
CREAT ?TM UR-MCNC: 66 MG/DL — SIGNIFICANT CHANGE UP
CREAT SERPL-MCNC: 1.4 MG/DL — SIGNIFICANT CHANGE UP (ref 0.7–1.5)
GLUCOSE SERPL-MCNC: 87 MG/DL — SIGNIFICANT CHANGE UP (ref 70–99)
HCT VFR BLD CALC: 41.5 % — LOW (ref 42–52)
HGB BLD-MCNC: 14.3 G/DL — SIGNIFICANT CHANGE UP (ref 14–18)
MCHC RBC-ENTMCNC: 30.3 PG — SIGNIFICANT CHANGE UP (ref 27–31)
MCHC RBC-ENTMCNC: 34.5 G/DL — SIGNIFICANT CHANGE UP (ref 32–37)
MCV RBC AUTO: 87.9 FL — SIGNIFICANT CHANGE UP (ref 80–94)
NRBC # BLD: 0 /100 WBCS — SIGNIFICANT CHANGE UP (ref 0–0)
OSMOLALITY UR: 375 MOS/KG — SIGNIFICANT CHANGE UP (ref 50–1400)
PLATELET # BLD AUTO: 263 K/UL — SIGNIFICANT CHANGE UP (ref 130–400)
POTASSIUM SERPL-MCNC: 4.7 MMOL/L — SIGNIFICANT CHANGE UP (ref 3.5–5)
POTASSIUM SERPL-SCNC: 4.7 MMOL/L — SIGNIFICANT CHANGE UP (ref 3.5–5)
PROT ?TM UR-MCNC: <5 MG/DLG/24H — SIGNIFICANT CHANGE UP
PROT SERPL-MCNC: 6.6 G/DL — SIGNIFICANT CHANGE UP (ref 6–8)
PROT/CREAT UR-RTO: <0.1 RATIO — SIGNIFICANT CHANGE UP (ref 0–0.2)
RBC # BLD: 4.72 M/UL — SIGNIFICANT CHANGE UP (ref 4.7–6.1)
RBC # FLD: 11.8 % — SIGNIFICANT CHANGE UP (ref 11.5–14.5)
SODIUM SERPL-SCNC: 142 MMOL/L — SIGNIFICANT CHANGE UP (ref 135–146)
SODIUM UR-SCNC: 101 MMOL/L — SIGNIFICANT CHANGE UP
WBC # BLD: 6.63 K/UL — SIGNIFICANT CHANGE UP (ref 4.8–10.8)
WBC # FLD AUTO: 6.63 K/UL — SIGNIFICANT CHANGE UP (ref 4.8–10.8)

## 2019-08-20 PROCEDURE — 99223 1ST HOSP IP/OBS HIGH 75: CPT

## 2019-08-20 PROCEDURE — 76770 US EXAM ABDO BACK WALL COMP: CPT | Mod: 26

## 2019-08-20 RX ORDER — SODIUM CHLORIDE 9 MG/ML
1000 INJECTION INTRAMUSCULAR; INTRAVENOUS; SUBCUTANEOUS
Refills: 0 | Status: DISCONTINUED | OUTPATIENT
Start: 2019-08-20 | End: 2019-08-22

## 2019-08-20 RX ORDER — CIPROFLOXACIN LACTATE 400MG/40ML
1 VIAL (ML) INTRAVENOUS
Qty: 20 | Refills: 0
Start: 2019-08-20 | End: 2019-08-29

## 2019-08-20 RX ORDER — PANTOPRAZOLE SODIUM 20 MG/1
40 TABLET, DELAYED RELEASE ORAL
Refills: 0 | Status: DISCONTINUED | OUTPATIENT
Start: 2019-08-20 | End: 2019-08-22

## 2019-08-20 RX ORDER — ALBUTEROL 90 UG/1
2 AEROSOL, METERED ORAL EVERY 6 HOURS
Refills: 0 | Status: DISCONTINUED | OUTPATIENT
Start: 2019-08-20 | End: 2019-08-22

## 2019-08-20 RX ORDER — METRONIDAZOLE 500 MG
1 TABLET ORAL
Qty: 30 | Refills: 0
Start: 2019-08-20 | End: 2019-08-29

## 2019-08-20 RX ORDER — PANTOPRAZOLE SODIUM 20 MG/1
1 TABLET, DELAYED RELEASE ORAL
Qty: 30 | Refills: 0
Start: 2019-08-20 | End: 2019-09-18

## 2019-08-20 RX ORDER — SODIUM CHLORIDE 9 MG/ML
1000 INJECTION INTRAMUSCULAR; INTRAVENOUS; SUBCUTANEOUS
Refills: 0 | Status: DISCONTINUED | OUTPATIENT
Start: 2019-08-20 | End: 2019-08-20

## 2019-08-20 RX ADMIN — Medication 100 MILLIGRAM(S): at 05:32

## 2019-08-20 RX ADMIN — Medication 100 MILLIGRAM(S): at 21:12

## 2019-08-20 RX ADMIN — Medication 100 MILLIGRAM(S): at 14:32

## 2019-08-20 RX ADMIN — CEFTRIAXONE 100 MILLIGRAM(S): 500 INJECTION, POWDER, FOR SOLUTION INTRAMUSCULAR; INTRAVENOUS at 21:12

## 2019-08-20 RX ADMIN — SODIUM CHLORIDE 100 MILLILITER(S): 9 INJECTION INTRAMUSCULAR; INTRAVENOUS; SUBCUTANEOUS at 05:33

## 2019-08-20 NOTE — DISCHARGE NOTE PROVIDER - NSDCFUSCHEDAPPT_GEN_ALL_CORE_FT
ELENI BLANKENSHIP ; 10/08/2019 ; P Urology [address] [place] [address] ELENI BLANKENSHIP ; 10/08/2019 ; P Urology [address] [place] [address] ELENI BLANKENSHIP ; 10/08/2019 ; P Urology [address] [place] [address] ELENI BLANKENSHIP ; 10/08/2019 ; P Urology [address] [place] [address] ELENI BLANKENSHIP ; 10/08/2019 ; P Urology [address] [place] [address]

## 2019-08-20 NOTE — DISCHARGE NOTE PROVIDER - NSDCCPCAREPLAN_GEN_ALL_CORE_FT
PRINCIPAL DISCHARGE DIAGNOSIS  Diagnosis: Interstitial nephritis  Assessment and Plan of Treatment:       SECONDARY DISCHARGE DIAGNOSES  Diagnosis: Diverticulitis  Assessment and Plan of Treatment: PRINCIPAL DISCHARGE DIAGNOSIS  Diagnosis: Diverticulitis  Assessment and Plan of Treatment: Continue taking your antibiotics for the next 10 days. Follow up with your [facility] physician in 1 week. You need to set up an appointment with gastroenterologists for a colonoscopy   If the symptoms recurr please present to the emergency room      SECONDARY DISCHARGE DIAGNOSES  Diagnosis: Interstitial nephritis  Assessment and Plan of Treatment: An admission you  had acute kidney injury. It was improving but It needed to be monitored however it was your preference to leave today. Continue hydrating yourself daily, avoid medications suchs as ipuprofen, advil, motrin...called NSAIDS. If you do have pain tylenol is preferable.   You need to recheck your basic metabolic panel (BMP) in 1 week with your primary care physician to make sure of continuous improvement. Otherwise you will need a neprhologist referral and further investigation

## 2019-08-20 NOTE — DISCHARGE NOTE PROVIDER - CARE PROVIDER_API CALL
Vladimir Lopez)  Internal Medicine  [address] [place] [address]  Phone: [phone]  Fax: [phone]  Follow Up Time: 1 week

## 2019-08-20 NOTE — H&P ADULT - HISTORY OF PRESENT ILLNESS
44 year old male with PMH multiple episodes of diverticulitis presents with complains 2 weeks of fatigue, nausea and worsening abdominal pain assoc with episodes of sweats and lightheadedness day before. As per the patient this weakness and fatigue is something new, never happened before. He denies any change in appetite, chest pain, racing of heart. No change in the color of stool, last bowel movement was today morning, it was regular. He has been chronically on cipro and flagyl, had episodes of diverticulitis since age of 19. He says he had colonoscopy done 2-3 years ago by his gastro physician which showed polyps. He was also evaluated by surgeon but never had any surgeries.   He also recently having heartburn and acid reflux. 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 He also complains of urinary retention at times and difficulty in initiating urine for the past 2 months.  He has history of sciatica and toe pain?? says he is conservatively managing his pain with exercise and medidation. He was prescribed Naproxen by his podiatrist but he has not been using them at all.

## 2019-08-20 NOTE — DISCHARGE NOTE PROVIDER - CARE PROVIDERS DIRECT ADDRESSES
[email or web address] [address].[email or web address].[facility]

## 2019-08-20 NOTE — H&P ADULT - NSHPLABSRESULTS_GEN_ALL_CORE
Complete Blood Count + Automated Diff (08.19.19 @ 17:18)    WBC Count: 7.93 K/uL    RBC Count: 5.42 M/uL    Hemoglobin: 16.3 g/dL    Hematocrit: 47.3 %    Mean Cell Volume: 87.3 fL    Mean Cell Hemoglobin: 30.1 pg    Mean Cell Hemoglobin Conc: 34.5 g/dL    Red Cell Distrib Width: 11.9 %    Platelet Count - Automated: 297 K/uL    Auto Neutrophil #: 6.20 K/uL    Auto Lymphocyte #: 1.24 K/uL    Auto Monocyte #: 0.36 K/uL    Auto Eosinophil #: 0.03 K/uL    Auto Basophil #: 0.07 K/uL    Auto Neutrophil %: 78.2: Differential percentages must be correlated with absolute numbers for  clinical significance. %    Auto Lymphocyte %: 15.6 %    Auto Monocyte %: 4.5 %    Auto Eosinophil %: 0.4 %    Auto Basophil %: 0.9 %    Auto Immature Granulocyte %: 0.4 %    Nucleated RBC: 0 /100 WBCs  Complete Blood Count + Automated Diff (08.19.19 @ 17:18)    WBC Count: 7.93 K/uL    RBC Count: 5.42 M/uL    Hemoglobin: 16.3 g/dL    Hematocrit: 47.3 %    Mean Cell Volume: 87.3 fL    Mean Cell Hemoglobin: 30.1 pg    Mean Cell Hemoglobin Conc: 34.5 g/dL    Red Cell Distrib Width: 11.9 %    Platelet Count - Automated: 297 K/uL    Auto Neutrophil #: 6.20 K/uL    Auto Lymphocyte #: 1.24 K/uL    Auto Monocyte #: 0.36 K/uL    Auto Eosinophil #: 0.03 K/uL    Auto Basophil #: 0.07 K/uL    Auto Neutrophil %: 78.2: Differential percentages must be correlated with absolute numbers for  clinical significance. %    Auto Lymphocyte %: 15.6 %    Auto Monocyte %: 4.5 %    Auto Eosinophil %: 0.4 %    Auto Basophil %: 0.9 %    Auto Immature Granulocyte %: 0.4 %    Nucleated RBC: 0 /100 WBCs  < from: CT Abdomen and Pelvis w/ IV Cont (08.19.19 @ 20:27) >      Trace inflammation of the sigmoid colon with an inflamed diverticulum,   correlate for mild acute diverticulitis. No abscess.    Both kidneys demonstrate abnormal contrast enhancement pattern, with   areas of slightly decreased enhancement. Correlate for interstitial   nephritis.    < end of copied text >

## 2019-08-20 NOTE — H&P ADULT - ATTENDING COMMENTS
Patient seen and examined independently. Resident's H & P reviewed. Agree with the findings and plan of care except,    A 44 years old male with PMH of Ch. Diverticulitis presented to the ED with 2 weeks H/O fatigue, nausea and worsening abdominal pain.     WBC: 6.63  BUN/Cr. 13/1.7  CT abd/pelvis: Trace inflammation of the sigmoid colon. Abnormal contrast enhancement of the both kidneys.   U/A: Negative    ASSESSMENT:    1. Ac. Diverticulitis  2. FERNANDO  3. H/O Asthma    PLAN:    . Cont IV Rocephin and Flagyl  . Cont IVF  . FERNANDO resolving Patient seen and examined independently. Resident's H & P reviewed. Agree with the findings and plan of care except,    A 44 years old male with PMH of Ch. Diverticulitis presented to the ED with 2 weeks H/O fatigue, nausea and worsening abdominal pain.     WBC: 6.63  BUN/Cr. 13/1.7  CT abd/pelvis: Trace inflammation of the sigmoid colon. Abnormal contrast enhancement of the both kidneys.   U/A: Negative    O/E: +ve tenderness in the LLQ.    ASSESSMENT:    1. Ac. Diverticulitis  2. FERNANDO likely prerenal  3. H/O Asthma    PLAN:    . Cont IV Rocephin and Flagyl  . Cont IVF  . Start him on clear liquid diet  . FERNANDO resolving  . Check CBC and BMP in AM

## 2019-08-20 NOTE — DISCHARGE NOTE PROVIDER - HOSPITAL COURSE
44 year old male with PMH multiple episodes of diverticulitis presents with complains 2 weeks of fatigue, nausea and worsening abdominal pain assoc with episodes of sweats and lightheadedness day before. As per the patient this weakness and fatigue is something new, never happened before. He denies any change in appetite, chest pain, racing of heart. No change in the color of stool, last bowel movement was today morning, it was regular. He has been chronically on cipro and flagyl, had episodes of diverticulitis since age of 19. He says he had colonoscopy done 2-3 years ago by his gastro physician which showed polyps. He was also evaluated by surgeon but never had any surgeries.     He also recently having heartburn and acid reflux. 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 He also complains of urinary retention at times and difficulty in initiating urine for the past 2 months.    He has history of sciatica and toe pain?? says he is conservatively managing his pain with exercise and medidation. He was prescribed Naproxen by his podiatrist but he has not been using them at all.         CT showed diverticulitis - Resolved with IV antibiotics. Will be discharged on cipro and flagyl 44 year old male with PMH multiple episodes of diverticulitis presents with complains 2 weeks of fatigue, nausea and worsening abdominal pain assoc with episodes of sweats and lightheadedness day before. As per the patient this weakness and fatigue is something new, never happened before. He denies any change in appetite, chest pain, racing of heart. No change in the color of stool, last bowel movement was today morning, it was regular. He has been chronically on cipro and flagyl, had episodes of diverticulitis since age of 19. He says he had colonoscopy done 2-3 years ago by his gastro physician which showed polyps. He was also evaluated by surgeon but never had any surgeries.     He also recently having heartburn and acid reflux. 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 He also complains of urinary retention at times and difficulty in initiating urine for the past 2 months.    He has history of sciatica and toe pain?? says he is conservatively managing his pain with exercise and medidation. He was prescribed Naproxen by his podiatrist but he has not been using them at all.         CT showed diverticulitis - Resolved with IV antibiotics. Will be discharged on cipro and flagyl         Creatinine 1.7 on admission now 1.4. Probably interstitial with renal studies consistent with intrinsic 44 year old male with PMH multiple episodes of diverticulitis presents with complains 2 weeks of fatigue, nausea and worsening abdominal pain assoc with episodes of sweats and lightheadedness day before. As per the patient this weakness and fatigue is something new, never happened before. He denies any change in appetite, chest pain, racing of heart. No change in the color of stool, last bowel movement was today morning, it was regular. He has been chronically on cipro and flagyl, had episodes of diverticulitis since age of 19. He says he had colonoscopy done 2-3 years ago by his gastro physician which showed polyps. He was also evaluated by surgeon but never had any surgeries.     He also recently having heartburn and acid reflux. 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 He also complains of urinary retention at times and difficulty in initiating urine for the past 2 months.    He has history of sciatica and toe pain?? says he is conservatively managing his pain with exercise and medidation. He was prescribed Naproxen by his podiatrist but he has not been using them at all.         CT showed diverticulitis - Resolved with IV antibiotics. Will be discharged on cipro and flagyl         Creatinine 1.7 on admission now 1.4.         3A 10A Ayaz Portillo         44 year old male with PMH multiple episodes of diverticulitis presents with complaints of 2 weeks of fatigue, nausea and worsening abdominal pain associated with episodes of sweats and lightheadedness. The weakness and fatigue is something new, never happened before. He also recently having heartburn and acid reflux He denies any change in appetite, chest pain, racing of heart. He also complains of urinary retention at times and difficulty in initiating urine for the past 2 months. No change in the color of stool, last bowel movement was today morning, it was regular. He has been chronically on Cipro and Flagyl, had episodes of diverticulitis since age of 19. He says he had colonoscopy done 2-3 years ago which showed polyps.         # Acute Diverticulitis     Continue IV Ceftriaxone and Flagyl     Continue IVF, 50 mL/hr    Advance diet as tolerated     Decrease IVF as able to tolerate advancing diet    FERNANDO resolving    Afebrile     # FERNANDO    Cr on admission was 1.7, today is 1.4     Continue gentle hydration    Monitor BMPs             DVT ppx: Heparin SubQ    GI ppx: Protonix, oral    Cr: 1.4    IVF/ABX: Ceftriaxone 1g q24, Flagyl 500mg q8    Dispo: Home 44 year old male with PMH multiple episodes of diverticulitis presents with complains 2 weeks of fatigue, nausea and worsening abdominal pain assoc with episodes of sweats and lightheadedness day before. As per the patient this weakness and fatigue is something new, never happened before. He denies any change in appetite, chest pain, racing of heart. No change in the color of stool, last bowel movement was today morning, it was regular. He has been chronically on cipro and flagyl, had episodes of diverticulitis since age of 19. He says he had colonoscopy done 2-3 years ago by his gastro physician which showed polyps. He was also evaluated by surgeon but never had any surgeries. He presented to the ED for evaluation of his abdominal pain and worsening constitutional symptoms. CT showed diverticulitis and was admitted for antibiotic management. Initially treated with Cipro and Flagyl, then ID saw the patient and recommended cessation of po abx therapy after 2 weeks of IV Ertapenem via midline.  Pain resolved with IV antibiotics. Will be discharged on IV ertapenem 1g daily for 14 days. As for his FERNANDO, his creatinine 1.7 on admission now 1.3, FERNANDO resolving.

## 2019-08-20 NOTE — H&P ADULT - NSHPPHYSICALEXAM_GEN_ALL_CORE
GENERAL: NAD, alert oriented X3  HEENT: No lymphadenopathy  CHEST: Clear to auscultate bilaterally  HEART: Normal S1 and S2  ABDOMEN: Soft nontender. No back pain. No CVA tenderness  EXTREMITIES: No edema  SKIN: No rash  NEURO: cranial nerves 2-12 grossly intact

## 2019-08-20 NOTE — DISCHARGE NOTE PROVIDER - INSTRUCTIONS
Low salt and cholesterol, Increase consumption of fruits vegetables and fibers and avoid excessive alcohol intake   High fiber, low residue

## 2019-08-20 NOTE — CHART NOTE - NSCHARTNOTEFT_GEN_A_CORE
Patient was very frustrated today because he still isn't in a room and feels he does not need to be here  Had to explain twice his condition and that he is  to palpation, no tolerating diet and still in FERNANDO he has to stay 24 hours more   He was understanding   - Continue with IVFs   - Continue antibiotics   - Follow up US abdomen

## 2019-08-20 NOTE — H&P ADULT - ASSESSMENT
# Diverticulitis evident on CT scan  -Continue with IV rocephin and flagyll for now  -IV fluids. Switch to PO antibiotics at the time of discharge.  -Follow up with GI outpatient.    # FERNANDO-likely interstitial Nephritis  -Could be because of Nsaids use. Patient refusing Naproxen use.  -Will order urine electrolytes.   -Complained of urinary retention-will order Kidney bladder US  -Start on flomax if BPH noted.  -Continue with gentle hydration    # Asthma -Mild  -Stable.   -Use Proair as needed    # DVT prophylaxis  -On heparin SQ # Diverticulitis evident on CT scan  -Continue with IV rocephin and flagyll for now  -IV fluids. Switch to PO antibiotics at the time of discharge.  -Follow up with GI outpatient.    # FERNANDO-likely interstitial Nephritis  -Could be because of Nsaids use. Patient refusing Naproxen use.  -Will order urine electrolytes.   -Complained of urinary retention-will order Kidney bladder US  -Start on flomax if BPH noted.  -Continue with gentle hydration    # Asthma -Mild  -Stable.   -Use Proair as needed    # Heart Burn  -Will put on PPI  -Advised patient to avoid Nsaids.     # DVT prophylaxis  -On heparin SQ

## 2019-08-21 LAB
ANION GAP SERPL CALC-SCNC: 11 MMOL/L — SIGNIFICANT CHANGE UP (ref 7–14)
BASOPHILS # BLD AUTO: 0.08 K/UL — SIGNIFICANT CHANGE UP (ref 0–0.2)
BASOPHILS NFR BLD AUTO: 1.4 % — HIGH (ref 0–1)
BUN SERPL-MCNC: 9 MG/DL — LOW (ref 10–20)
CALCIUM SERPL-MCNC: 9.3 MG/DL — SIGNIFICANT CHANGE UP (ref 8.5–10.1)
CHLORIDE SERPL-SCNC: 107 MMOL/L — SIGNIFICANT CHANGE UP (ref 98–110)
CO2 SERPL-SCNC: 27 MMOL/L — SIGNIFICANT CHANGE UP (ref 17–32)
CREAT SERPL-MCNC: 1.3 MG/DL — SIGNIFICANT CHANGE UP (ref 0.7–1.5)
EOSINOPHIL # BLD AUTO: 0.17 K/UL — SIGNIFICANT CHANGE UP (ref 0–0.7)
EOSINOPHIL NFR BLD AUTO: 3 % — SIGNIFICANT CHANGE UP (ref 0–8)
GLUCOSE SERPL-MCNC: 94 MG/DL — SIGNIFICANT CHANGE UP (ref 70–99)
HCT VFR BLD CALC: 41.9 % — LOW (ref 42–52)
HGB BLD-MCNC: 14.4 G/DL — SIGNIFICANT CHANGE UP (ref 14–18)
IMM GRANULOCYTES NFR BLD AUTO: 0.2 % — SIGNIFICANT CHANGE UP (ref 0.1–0.3)
LYMPHOCYTES # BLD AUTO: 1.64 K/UL — SIGNIFICANT CHANGE UP (ref 1.2–3.4)
LYMPHOCYTES # BLD AUTO: 28.5 % — SIGNIFICANT CHANGE UP (ref 20.5–51.1)
MCHC RBC-ENTMCNC: 30.3 PG — SIGNIFICANT CHANGE UP (ref 27–31)
MCHC RBC-ENTMCNC: 34.4 G/DL — SIGNIFICANT CHANGE UP (ref 32–37)
MCV RBC AUTO: 88.2 FL — SIGNIFICANT CHANGE UP (ref 80–94)
MONOCYTES # BLD AUTO: 0.47 K/UL — SIGNIFICANT CHANGE UP (ref 0.1–0.6)
MONOCYTES NFR BLD AUTO: 8.2 % — SIGNIFICANT CHANGE UP (ref 1.7–9.3)
NEUTROPHILS # BLD AUTO: 3.39 K/UL — SIGNIFICANT CHANGE UP (ref 1.4–6.5)
NEUTROPHILS NFR BLD AUTO: 58.7 % — SIGNIFICANT CHANGE UP (ref 42.2–75.2)
NRBC # BLD: 0 /100 WBCS — SIGNIFICANT CHANGE UP (ref 0–0)
PH UR: 6.5 — SIGNIFICANT CHANGE UP (ref 5–8)
PLATELET # BLD AUTO: 265 K/UL — SIGNIFICANT CHANGE UP (ref 130–400)
POTASSIUM SERPL-MCNC: 4.7 MMOL/L — SIGNIFICANT CHANGE UP (ref 3.5–5)
POTASSIUM SERPL-SCNC: 4.7 MMOL/L — SIGNIFICANT CHANGE UP (ref 3.5–5)
RBC # BLD: 4.75 M/UL — SIGNIFICANT CHANGE UP (ref 4.7–6.1)
RBC # FLD: 11.9 % — SIGNIFICANT CHANGE UP (ref 11.5–14.5)
SODIUM SERPL-SCNC: 145 MMOL/L — SIGNIFICANT CHANGE UP (ref 135–146)
WBC # BLD: 5.76 K/UL — SIGNIFICANT CHANGE UP (ref 4.8–10.8)
WBC # FLD AUTO: 5.76 K/UL — SIGNIFICANT CHANGE UP (ref 4.8–10.8)

## 2019-08-21 PROCEDURE — 99233 SBSQ HOSP IP/OBS HIGH 50: CPT

## 2019-08-21 RX ORDER — DOCUSATE SODIUM 100 MG
100 CAPSULE ORAL ONCE
Refills: 0 | Status: COMPLETED | OUTPATIENT
Start: 2019-08-21 | End: 2019-08-21

## 2019-08-21 RX ORDER — ERTAPENEM SODIUM 1 G/1
1000 INJECTION, POWDER, LYOPHILIZED, FOR SOLUTION INTRAMUSCULAR; INTRAVENOUS EVERY 24 HOURS
Refills: 0 | Status: COMPLETED | OUTPATIENT
Start: 2019-08-21 | End: 2019-08-22

## 2019-08-21 RX ADMIN — SODIUM CHLORIDE 50 MILLILITER(S): 9 INJECTION INTRAMUSCULAR; INTRAVENOUS; SUBCUTANEOUS at 10:33

## 2019-08-21 RX ADMIN — Medication 100 MILLIGRAM(S): at 05:08

## 2019-08-21 RX ADMIN — ERTAPENEM SODIUM 120 MILLIGRAM(S): 1 INJECTION, POWDER, LYOPHILIZED, FOR SOLUTION INTRAMUSCULAR; INTRAVENOUS at 18:26

## 2019-08-21 RX ADMIN — PANTOPRAZOLE SODIUM 40 MILLIGRAM(S): 20 TABLET, DELAYED RELEASE ORAL at 05:08

## 2019-08-21 RX ADMIN — Medication 100 MILLIGRAM(S): at 18:59

## 2019-08-21 RX ADMIN — Medication 100 MILLIGRAM(S): at 13:37

## 2019-08-21 NOTE — PROGRESS NOTE ADULT - SUBJECTIVE AND OBJECTIVE BOX
ELENI BLANKENSHIP 44y Male      Patient is a 44y old  Male who presents with a chief complaint of       INTERVAL HPI/OVERNIGHT EVENTS: No acute events overnight. Patient was seen and evaluated at the bedside. The patient denies pain. Vitals stable. Patient denies fever/chills, chest pain, shortness of breath, abdominal pain, headaches, nausea/vomiting, and diarrhea/constipation.    PHYSICAL EXAM:  GENERAL: NAD, well-groomed, well-developed  HEAD:  Atraumatic, Normocephalic  EYES: EOMI, PERRLA, conjunctiva and sclera clear  NERVOUS SYSTEM:  Alert & Oriented, CN II-XII intact bilaterally  CHEST/LUNG: Clear to auscultation bilaterally; No rales, rhonchi, wheezing, or rubs  HEART: Regular rate and rhythm; No murmurs, rubs, or gallops  ABDOMEN: Soft, Nontender, Nondistended; Bowel sounds present  EXTREMITIES:  2+ Peripheral Pulses, No clubbing, cyanosis, or edema        Vital Signs Last 24 Hrs  T(C): 36.7 (21 Aug 2019 05:54), Max: 36.7 (21 Aug 2019 05:54)  T(F): 98.1 (21 Aug 2019 05:54), Max: 98.1 (21 Aug 2019 05:54)  HR: 64 (21 Aug 2019 05:54) (64 - 74)  BP: 107/56 (21 Aug 2019 05:54) (107/56 - 132/85)  BP(mean): --  RR: 18 (21 Aug 2019 05:54) (17 - 18)  SpO2: 100% (20 Aug 2019 23:13) (98% - 100%)      Consultant(s) Notes Reviewed:  [X] YES  [ ] NO  Care Discussed with Consultants/Other Providers [X] YES  [ ] NO  Imaging Personally Reviewed:  [X] YES  [ ] NO      LABS:                        14.4   5.76  )-----------( 265      ( 21 Aug 2019 06:51 )             41.9     08-    145  |  107  |  9<L>  ----------------------------<  94  4.7   |  27  |  1.3    Ca    9.3      21 Aug 2019 06:51    TPro  6.6  /  Alb  4.4  /  TBili  0.7  /  DBili  x   /  AST  12  /  ALT  11  /  AlkPhos  49  08-20      Urinalysis Basic - ( 19 Aug 2019 17:18 )    Color: Yellow / Appearance: Clear / S.014 / pH: x  Gluc: x / Ketone: Negative  / Bili: Negative / Urobili: <2 mg/dL   Blood: x / Protein: Trace / Nitrite: Negative   Leuk Esterase: Negative / RBC: x / WBC x   Sq Epi: x / Non Sq Epi: x / Bacteria: x        CAPILLARY BLOOD GLUCOSE

## 2019-08-21 NOTE — CONSULT NOTE ADULT - ASSESSMENT
44 year old male with PMH multiple episodes of diverticulitis presents with complaints of 2 weeks of fatigue, nausea and worsening abdominal pain:   # Acute Diverticulitis, recurrent:   -	Start IV ertapenem 1 g daily. Check blood cx.   -	Pt advised regarding PICC line and IV therapy for 2 weeks but pt is not sure if he wants IV therapy at home. He wants to think about it.   -	CT scan shows mild inflammation with no signs of perforation, abscess or obstruction.   -	Afebrile, no leukocytosis.     # FERNANDO  	- Creat trending down

## 2019-08-21 NOTE — CONSULT NOTE ADULT - SUBJECTIVE AND OBJECTIVE BOX
HPI:   44 year old male with PMH multiple episodes of diverticulitis presents with complaints of 2 weeks of fatigue, nausea and worsening abdominal pain associated with episodes of sweats and lightheadedness. The weakness and fatigue is something new, never happened before. He also recently having heartburn and acid reflux He denies any change in appetite, chest pain, racing of heart. He also complains of urinary retention at times and difficulty in initiating urine for the past 2 months. No change in the color of stool, last bowel movement was today morning, it was regular. He has been chronically on Cipro and Flagyl, had episodes of diverticulitis since age of 19. He says he had colonoscopy done 2-3 years ago which showed polyps.       No Known Allergies        ALBUTerol    90 MICROgram(s) HFA Inhaler 2 Puff(s) Inhalation every 6 hours PRN  cefTRIAXone   IVPB 1000 milliGRAM(s) IV Intermittent every 24 hours  chlorhexidine 4% Liquid 1 Application(s) Topical <User Schedule>  heparin  Injectable 5000 Unit(s) SubCutaneous every 8 hours  metroNIDAZOLE  IVPB 500 milliGRAM(s) IV Intermittent every 8 hours  pantoprazole    Tablet 40 milliGRAM(s) Oral before breakfast  sodium chloride 0.9%. 1000 milliLiter(s) IV Continuous <Continuous>    cefTRIAXone   IVPB 1000 milliGRAM(s) IV Intermittent every 24 hours  metroNIDAZOLE  IVPB 500 milliGRAM(s) IV Intermittent every 8 hours      T(C): 36.7 (19 @ 14:56), Max: 36.7 (19 @ 05:54)  HR: 68 (19 @ 14:56) (64 - 74)  BP: 120/75 (19 @ 14:56) (107/56 - 132/85)  RR: 18 (19 @ 14:56) (17 - 18)  SpO2: 100% (19 @ 23:13) (98% - 100%)                            14.4   5.76  )-----------( 265      ( 21 Aug 2019 06:51 )             41.9           145  |  107  |  9<L>  ----------------------------<  94  4.7   |  27  |  1.3    Ca    9.3      21 Aug 2019 06:51    TPro  6.6  /  Alb  4.4  /  TBili  0.7  /  DBili  x   /  AST  12  /  ALT  11  /  AlkPhos  49            Urinalysis Basic - ( 19 Aug 2019 17:18 )    Color: Yellow / Appearance: Clear / S.014 / pH: x  Gluc: x / Ketone: Negative  / Bili: Negative / Urobili: <2 mg/dL   Blood: x / Protein: Trace / Nitrite: Negative   Leuk Esterase: Negative / RBC: x / WBC x   Sq Epi: x / Non Sq Epi: x / Bacteria: x    Culture - Urine (19 @ 00:00)    Specimen Source: .Urine Clean Catch (Midstream)    Culture Results:   No growth      EXAM:  US KIDNEYS AND BLADDER            PROCEDURE DATE:  2019            INTERPRETATION:  CLINICAL HISTORY: Acute kidney injury    COMPARISON: CT abdomen and pelvis 2019    PROCEDURE: Kidney and bladder ultrasound was performed.    FINDINGS:    RIGHT KIDNEY: Normal in echogenicity, size measuring 11 cm in length. No   evidence of hydronephrosis, calculus or solid mass. Vascular flow is   demonstrated at the hilum.    LEFT KIDNEY: Normal in echogenicity, size measuring 11.1 cm in length. No   evidence of hydronephrosis, calculus or solid mass. Vascular flow is   demonstrated at the hilum.     URINARY BLADDER: Prevoid volume of approximately 52 cc.  No wall   thickening, debris or calculus is seen. Bilateral ureteral jets are   visualized. Postvoid volume is approximately 0 cc.    Prostate measures 12.5 cc, normal.    IMPRESSION:  No renal calculi or hydronephrosis.    Normal voiding of the bladder.    EXAM:  CT ABDOMEN AND PELVIS IC            PROCEDURE DATE:  2019            INTERPRETATION:  CLINICAL STATEMENT: Left lower quadrant pain.      TECHNIQUE: Contiguous axial CT images were obtained from the lower chest   to the pubic symphysis following administration of 100cc Optiray 320   intravenous contrast.  Oral contrast was not administered.  Reformatted   images in the coronal and sagittal planes were acquired.    COMPARISON CT: 2019    OTHER STUDIES USED FOR CORRELATION: None.       FINDINGS:    LOWER CHEST: Unremarkable.    HEPATOBILIARY: Unremarkable.    SPLEEN: Unremarkable.    PANCREAS: Unremarkable.    ADRENAL GLANDS: Unremarkable.    KIDNEYS: Both kidneys demonstrate abnormal contrast enhancement pattern,   with areasof slightly decreased enhancement. Renal veins appear patent.    ABDOMINOPELVIC NODES: Unremarkable.    PELVIC ORGANS: Unremarkable.    PERITONEUM/MESENTERY/BOWEL: Left colonic diverticulosis. Trace   inflammation of the sigmoid colon. No bowel obstruction.    BONES/SOFT TISSUES: Small left fat-containing inguinal hernia. No acute   abnormalities.    OTHER:      IMPRESSION:    Trace inflammation of the sigmoid colon with an inflamed diverticulum,   correlate for mild acute diverticulitis. No abscess.    Both kidneys demonstrate abnormal contrast enhancement pattern, with   areas of slightly decreased enhancement. Correlate for interstitial   nephritis. HPI:   44 year old male with PMH multiple episodes of diverticulitis since he was young,  presents with complaints of 2 weeks of fatigue, nausea and worsening abdominal pain associated with episodes of sweats and lightheadedness. The symptoms are new and he felt like he was going to pass out.   He has been taking oral antibiotics with resolution of symptoms in the past. He was referred to surgery. He did not agree for surgical resection as he gets better with oral antibiotics.   He has been chronically on Cipro and Flagyl, had episodes of diverticulitis since age of 19. He says he had colonoscopy done 2-3 years ago which showed polyps.     Currently no pain or nausea, vomiting or diarrhea.     No Known Allergies    ALBUTerol    90 MICROgram(s) HFA Inhaler 2 Puff(s) Inhalation every 6 hours PRN  cefTRIAXone   IVPB 1000 milliGRAM(s) IV Intermittent every 24 hours  chlorhexidine 4% Liquid 1 Application(s) Topical <User Schedule>  heparin  Injectable 5000 Unit(s) SubCutaneous every 8 hours  metroNIDAZOLE  IVPB 500 milliGRAM(s) IV Intermittent every 8 hours  pantoprazole    Tablet 40 milliGRAM(s) Oral before breakfast  sodium chloride 0.9%. 1000 milliLiter(s) IV Continuous <Continuous>    cefTRIAXone   IVPB 1000 milliGRAM(s) IV Intermittent every 24 hours  metroNIDAZOLE  IVPB 500 milliGRAM(s) IV Intermittent every 8 hours    AOx3, S1s2, sitting down in chair, s1s2, Abdomen- Soft, non tender, bowel sounds present.        T(C): 36.7 (19 @ 14:56), Max: 36.7 (19 @ 05:54)  HR: 68 (19 @ 14:56) (64 - 74)  BP: 120/75 (19 @ 14:56) (107/56 - 132/85)  RR: 18 (19 @ 14:56) (17 - 18)  SpO2: 100% (19 @ 23:13) (98% - 100%)                            14.4   5.76  )-----------( 265      ( 21 Aug 2019 06:51 )             41.9       08-    145  |  107  |  9<L>  ----------------------------<  94  4.7   |  27  |  1.3    Ca    9.3      21 Aug 2019 06:51    TPro  6.6  /  Alb  4.4  /  TBili  0.7  /  DBili  x   /  AST  12  /  ALT  11  /  AlkPhos  49            Urinalysis Basic - ( 19 Aug 2019 17:18 )    Color: Yellow / Appearance: Clear / S.014 / pH: x  Gluc: x / Ketone: Negative  / Bili: Negative / Urobili: <2 mg/dL   Blood: x / Protein: Trace / Nitrite: Negative   Leuk Esterase: Negative / RBC: x / WBC x   Sq Epi: x / Non Sq Epi: x / Bacteria: x    Culture - Urine (19 @ 00:00)    Specimen Source: .Urine Clean Catch (Midstream)    Culture Results:   No growth      EXAM:  US KIDNEYS AND BLADDER          PROCEDURE DATE:  2019      INTERPRETATION:  CLINICAL HISTORY: Acute kidney injury    COMPARISON: CT abdomen and pelvis 2019    PROCEDURE: Kidney and bladder ultrasound was performed.    FINDINGS:    RIGHT KIDNEY: Normal in echogenicity, size measuring 11 cm in length. No   evidence of hydronephrosis, calculus or solid mass. Vascular flow is   demonstrated at the hilum.    LEFT KIDNEY: Normal in echogenicity, size measuring 11.1 cm in length. No   evidence of hydronephrosis, calculus or solid mass. Vascular flow is   demonstrated at the hilum.     URINARY BLADDER: Prevoid volume of approximately 52 cc.  No wall   thickening, debris or calculus is seen. Bilateral ureteral jets are   visualized. Postvoid volume is approximately 0 cc.    Prostate measures 12.5 cc, normal.    IMPRESSION:  No renal calculi or hydronephrosis.    Normal voiding of the bladder.    EXAM:  CT ABDOMEN AND PELVIS IC            PROCEDURE DATE:  2019      INTERPRETATION:  CLINICAL STATEMENT: Left lower quadrant pain.      TECHNIQUE: Contiguous axial CT images were obtained from the lower chest   to the pubic symphysis following administration of 100cc Optiray 320   intravenous contrast.  Oral contrast was not administered.  Reformatted   images in the coronal and sagittal planes were acquired.    COMPARISON CT: 2019    OTHER STUDIES USED FOR CORRELATION: None.       FINDINGS:    LOWER CHEST: Unremarkable.    HEPATOBILIARY: Unremarkable.    SPLEEN: Unremarkable.    PANCREAS: Unremarkable.    ADRENAL GLANDS: Unremarkable.    KIDNEYS: Both kidneys demonstrate abnormal contrast enhancement pattern,   with areasof slightly decreased enhancement. Renal veins appear patent.    ABDOMINOPELVIC NODES: Unremarkable.    PELVIC ORGANS: Unremarkable.    PERITONEUM/MESENTERY/BOWEL: Left colonic diverticulosis. Trace   inflammation of the sigmoid colon. No bowel obstruction.    BONES/SOFT TISSUES: Small left fat-containing inguinal hernia. No acute   abnormalities.    OTHER:      IMPRESSION:    Trace inflammation of the sigmoid colon with an inflamed diverticulum,   correlate for mild acute diverticulitis. No abscess.    Both kidneys demonstrate abnormal contrast enhancement pattern, with   areas of slightly decreased enhancement. Correlate for interstitial   nephritis.

## 2019-08-21 NOTE — PROGRESS NOTE ADULT - SUBJECTIVE AND OBJECTIVE BOX
ELENI BLANKENSHIP  44y  Male      Patient is a 44y old  Male who presents with a chief complaint of     INTERVAL HPI/OVERNIGHT EVENTS:      ******************************* REVIEW OF SYSTEMS:**********************************************      All other review of systems negative    *********************** VITALS ******************************************    T(F): 98.1 (19 @ 05:54)  HR: 64 (19 @ 05:54) (64 - 74)  BP: 107/56 (19 @ 05:54) (107/56 - 132/85)  RR: 18 (19 @ 05:54) (17 - 18)  SpO2: 100% (19 @ 23:13) (98% - 100%)            ******************************** PHYSICAL EXAM:**************************************************  GENERAL: NAD    PSYCH: no agitation, baseline mentation  HEENT:     NERVOUS SYSTEM:  Alert & Oriented X3, MS  5/5 B/L  UE and LE ; Sensory intact    PULMONARY: JESSICA, CTA    CARDIOVASCULAR: S1S2 RRR    GI: Soft, NT, ND; BS present.    EXTREMITIES:  2+ Peripheral Pulses, No clubbing, cyanosis, or edema    LYMPH: No lymphadenopathy noted    SKIN: No rashes or lesions    ******************************************************************************************    **************************** LABS *******************************************************                          14.4   5.76  )-----------( 265      ( 21 Aug 2019 06:51 )             41.9     08-    145  |  107  |  9<L>  ----------------------------<  94  4.7   |  27  |  1.3    Ca    9.3      21 Aug 2019 06:51    TPro  6.6  /  Alb  4.4  /  TBili  0.7  /  DBili  x   /  AST  12  /  ALT  11  /  AlkPhos  49  08-20      Urinalysis Basic - ( 19 Aug 2019 17:18 )    Color: Yellow / Appearance: Clear / S.014 / pH: x  Gluc: x / Ketone: Negative  / Bili: Negative / Urobili: <2 mg/dL   Blood: x / Protein: Trace / Nitrite: Negative   Leuk Esterase: Negative / RBC: x / WBC x   Sq Epi: x / Non Sq Epi: x / Bacteria: x        Lactate Trend   @ 17:18 Lactate:0.8         CAPILLARY BLOOD GLUCOSE              **************************Active Medications *******************************************  No Known Allergies      ALBUTerol    90 MICROgram(s) HFA Inhaler 2 Puff(s) Inhalation every 6 hours PRN  cefTRIAXone   IVPB 1000 milliGRAM(s) IV Intermittent every 24 hours  chlorhexidine 4% Liquid 1 Application(s) Topical <User Schedule>  heparin  Injectable 5000 Unit(s) SubCutaneous every 8 hours  metroNIDAZOLE  IVPB 500 milliGRAM(s) IV Intermittent every 8 hours  pantoprazole    Tablet 40 milliGRAM(s) Oral before breakfast  sodium chloride 0.9%. 1000 milliLiter(s) IV Continuous <Continuous>      ***************************************************  RADIOLOGY & ADDITIONAL TESTS:    Imaging Personally Reviewed:  [ ] YES  [ ] NO    HEALTH ISSUES - PROBLEM Dx:

## 2019-08-22 ENCOUNTER — TRANSCRIPTION ENCOUNTER (OUTPATIENT)
Age: 44
End: 2019-08-22

## 2019-08-22 VITALS
RESPIRATION RATE: 15 BRPM | TEMPERATURE: 99 F | HEART RATE: 74 BPM | SYSTOLIC BLOOD PRESSURE: 147 MMHG | DIASTOLIC BLOOD PRESSURE: 85 MMHG

## 2019-08-22 PROCEDURE — 99233 SBSQ HOSP IP/OBS HIGH 50: CPT

## 2019-08-22 RX ORDER — ERTAPENEM SODIUM 1 G/1
1 INJECTION, POWDER, LYOPHILIZED, FOR SOLUTION INTRAMUSCULAR; INTRAVENOUS
Qty: 0 | Refills: 0 | DISCHARGE
Start: 2019-08-22 | End: 2019-09-05

## 2019-08-22 RX ADMIN — ERTAPENEM SODIUM 120 MILLIGRAM(S): 1 INJECTION, POWDER, LYOPHILIZED, FOR SOLUTION INTRAMUSCULAR; INTRAVENOUS at 17:29

## 2019-08-22 RX ADMIN — PANTOPRAZOLE SODIUM 40 MILLIGRAM(S): 20 TABLET, DELAYED RELEASE ORAL at 05:21

## 2019-08-22 NOTE — PROGRESS NOTE ADULT - SUBJECTIVE AND OBJECTIVE BOX
ELENI BLANKENSHIP  44y  Male      Patient is a 44y old  Male who presents with a chief complaint of diverticulitis (22 Aug 2019 10:50)      INTERVAL HPI/OVERNIGHT EVENTS:      ******************************* REVIEW OF SYSTEMS:**********************************************  Feeling better   All other review of systems negative    *********************** VITALS ******************************************    T(F): 99.2 (08-22-19 @ 12:31)  HR: 74 (08-22-19 @ 12:31) (61 - 74)  BP: 147/85 (08-22-19 @ 12:31) (116/70 - 147/85)  RR: 15 (08-22-19 @ 12:31) (15 - 18)  SpO2: --            ******************************** PHYSICAL EXAM:**************************************************  GENERAL: NAD    PSYCH: no agitation, baseline mentation  HEENT:     NERVOUS SYSTEM:  Alert & Oriented X3, MS  5/5 B/L  UE and LE ; Sensory intact    PULMONARY: JESSICA, CTA    CARDIOVASCULAR: S1S2 RRR    GI: Soft, NT, ND; BS present.    EXTREMITIES:  2+ Peripheral Pulses, No clubbing, cyanosis, or edema    LYMPH: No lymphadenopathy noted    SKIN: No rashes or lesions    ******************************************************************************************    Consultant(s) Notes Reviewed:  [x ] YES  [ ] NO    Discussed with Consultants/Other Providers [ x] YES     **************************** LABS *******************************************************                          14.4   5.76  )-----------( 265      ( 21 Aug 2019 06:51 )             41.9     08-21    145  |  107  |  9<L>  ----------------------------<  94  4.7   |  27  |  1.3    Ca    9.3      21 Aug 2019 06:51            Lactate Trend  08-19 @ 17:18 Lactate:0.8         CAPILLARY BLOOD GLUCOSE              **************************Active Medications *******************************************  No Known Allergies      ALBUTerol    90 MICROgram(s) HFA Inhaler 2 Puff(s) Inhalation every 6 hours PRN  chlorhexidine 4% Liquid 1 Application(s) Topical <User Schedule>  ertapenem  IVPB 1000 milliGRAM(s) IV Intermittent every 24 hours  heparin  Injectable 5000 Unit(s) SubCutaneous every 8 hours  pantoprazole    Tablet 40 milliGRAM(s) Oral before breakfast      ***************************************************  RADIOLOGY & ADDITIONAL TESTS:    Imaging Personally Reviewed:  [ ] YES  [ ] NO    HEALTH ISSUES - PROBLEM Dx:

## 2019-08-22 NOTE — PROGRESS NOTE ADULT - ASSESSMENT
3A 10A Ayaz Portillo     44 year old male with PMH multiple episodes of diverticulitis presents with complaints of 2 weeks of fatigue, nausea and worsening abdominal pain associated with episodes of sweats and lightheadedness. The weakness and fatigue is something new, never happened before. He also recently having heartburn and acid reflux He denies any change in appetite, chest pain, racing of heart. He also complains of urinary retention at times and difficulty in initiating urine for the past 2 months. No change in the color of stool, last bowel movement was today morning, it was regular. He has been chronically on Cipro and Flagyl, had episodes of diverticulitis since age of 19. He says he had colonoscopy done 2-3 years ago which showed polyps.     # Acute Diverticulitis   -	Continue IV Ceftriaxone and Flagyl   -	Continue IVF, 50 mL/hr  -	Advance diet as tolerated   -	Decrease IVF as able to tolerate advancing diet  -	FERNANDO resolving  -	Afebrile   # FERNANDO  -	Cr on admission was 1.7, today is 1.3  -	Continue gentle hydration  -	Monitor BMPs       DVT ppx: Heparin SubQ  GI ppx: Protonix, oral  Cr: 1.4  IVF/ABX: Ceftriaxone 1g q24, Flagyl 500mg q8  Dispo: Home     #Progress Note Handoff  Pending (specify):  Tolerance to diet.   Family discussion: NA, d/W the patient   Disposition: Home
3A 10A Ayaz Portillo     44 year old male with PMH multiple episodes of diverticulitis presents with complaints of 2 weeks of fatigue, nausea and worsening abdominal pain associated with episodes of sweats and lightheadedness. The weakness and fatigue is something new, never happened before. He also recently having heartburn and acid reflux He denies any change in appetite, chest pain, racing of heart. He also complains of urinary retention at times and difficulty in initiating urine for the past 2 months. No change in the color of stool, last bowel movement was today morning, it was regular. He has been chronically on Cipro and Flagyl, had episodes of diverticulitis since age of 19. He says he had colonoscopy done 2-3 years ago which showed polyps.     # Acute Diverticulitis   -	Continue IV Ceftriaxone and Flagyl   -	Continue IVF, 50 mL/hr  -	Advance diet as tolerated   -	Decrease IVF as able to tolerate advancing diet  -	FERNANDO resolving  -	Afebrile   # FERNANDO  -	Cr on admission was 1.7, today is 1.4   -	Continue gentle hydration  -	Monitor BMPs       DVT ppx: Heparin SubQ  GI ppx: Protonix, oral  Cr: 1.4  IVF/ABX: Ceftriaxone 1g q24, Flagyl 500mg q8  Dispo: Home
44 year old male with PMH multiple episodes of diverticulitis presents with complaints of 2 weeks of fatigue, nausea and worsening abdominal pain associated with episodes of sweats and lightheadedness. The weakness and fatigue is something new, never happened before. He also recently having heartburn and acid reflux He denies any change in appetite, chest pain, racing of heart. He also complains of urinary retention at times and difficulty in initiating urine for the past 2 months. No change in the color of stool, last bowel movement was today morning, it was regular. He has been chronically on Cipro and Flagyl, had episodes of diverticulitis since age of 19. He says he had colonoscopy done 2-3 years ago which showed polyps.     # Acute Diverticulitis   -	started on  Ertapenem 1g q24 IV  -	Advanced diet as tolerated   -	ID eval noted  regarding midline  and IVABX  # FERNANDO >> improved. Last Cr 1.3    DVT ppx: Heparin SubQ  GI ppx: Protonix, oral  Cr: 1.3  IVF/ABX: Ertapenem 1g q24    D/C planning today with midline / IV abx x 2 weeks.   Dispo: Home
3A 10A Ayaz Portillo     44 year old male with PMH multiple episodes of diverticulitis presents with complaints of 2 weeks of fatigue, nausea and worsening abdominal pain associated with episodes of sweats and lightheadedness. The weakness and fatigue is something new, never happened before. He also recently having heartburn and acid reflux He denies any change in appetite, chest pain, racing of heart. He also complains of urinary retention at times and difficulty in initiating urine for the past 2 months. No change in the color of stool, last bowel movement was today morning, it was regular. He has been chronically on Cipro and Flagyl, had episodes of diverticulitis since age of 19. He says he had colonoscopy done 2-3 years ago which showed polyps.     # Acute Diverticulitis   -	Continue Ertapenem 1g q24 IV  -	Advanced diet as tolerated   -	Decrease IVF as able to tolerate advancing diet  -	FERNANDO resolving  -	Afebrile   -	Follow-up with ID regarding PICC and IVABX  # FERNANDO  -	Cr on admission was 1.7, today is 1.3   -	Continue gentle hydration  -	Monitor BMPs       DVT ppx: Heparin SubQ  GI ppx: Protonix, oral  Cr: 1.3  IVF/ABX: Ertapenem 1g q24  Dispo: Home

## 2019-08-22 NOTE — CHART NOTE - NSCHARTNOTEFT_GEN_A_CORE
<<<RESIDENT DISCHARGE NOTE>>>     ELENI BLANKENSHIP  MRN-[id number]    VITAL SIGNS:  T(F): 99.2 (08-22-19 @ 12:31), Max: 99.4 (08-21-19 @ 20:36)  HR: 74 (08-22-19 @ 12:31)  BP: 147/85 (08-22-19 @ 12:31)  SpO2: --      PHYSICAL EXAMINATION:  General: No acute distress, denies pain  Head & Neck: Atraumatic, no bruits, no thyromegaly  Pulmonary: Lung sounds clear and equal bilaterally, no crackles or wheeze  Cardiovascular: Regular rate and rhythm, no bruits, murmurs, or rubs  Gastrointestinal/Abdomen & Pelvis: Nondistended, nontender to palpation, no bruits, no masses  Neurologic/Motor: AAOx3, no tremors, no fasciculations, CN II-XII intact bilaterally     TEST RESULTS:                        14.4   5.76  )-----------( 265      ( 21 Aug 2019 06:51 )             41.9       08-21    145  |  107  |  9<L>  ----------------------------<  94  4.7   |  27  |  1.3    Ca    9.3      21 Aug 2019 06:51        FINAL DISCHARGE INTERVIEW:  Resident(s) Present: (Name:_____Dr. Andre Junior MD________), RN Present: (Name:  ___RN________)    DISCHARGE MEDICATION RECONCILIATION  reviewed with Attending (Name:____Dr. Christina MD_______)    DISPOSITION:   [  ] Home,    [ X ] Home with Visiting Nursing Services,   [    ]  SNF/ NH,    [   ] Acute Rehab (4A),   [   ] Other (Specify:_________)

## 2019-08-22 NOTE — DISCHARGE NOTE NURSING/CASE MANAGEMENT/SOCIAL WORK - NSDCDPATPORTLINK_GEN_ALL_CORE
You can access the [facility] Patient Portal, offered by [facility], by registering with the following website: [email or web address]

## 2019-08-22 NOTE — PROGRESS NOTE ADULT - SUBJECTIVE AND OBJECTIVE BOX
ELENI BLANKENSHIP 44y Male      Patient is a 44y old  Male who presents with a chief complaint of Abd pain (21 Aug 2019 15:33)        INTERVAL HPI/OVERNIGHT EVENTS: No acute events overnight. Patient was seen and evaluated at the bedside. The patient denies pain. Vitals stable. Patient denies fever/chills, chest pain, shortness of breath, significant abdominal pain, headaches, nausea/vomiting, and diarrhea/constipation.    PHYSICAL EXAM:  GENERAL: NAD, well-groomed, well-developed  HEAD:  Atraumatic, Normocephalic  EYES: EOMI, PERRLA, conjunctiva and sclera clear  NERVOUS SYSTEM:  Alert & Oriented, CN II-XII intact bilaterally  CHEST/LUNG: Clear to auscultation bilaterally; No rales, rhonchi, wheezing, or rubs  HEART: Regular rate and rhythm; No murmurs, rubs, or gallops  ABDOMEN: Soft, Nontender, Nondistended; Bowel sounds present  EXTREMITIES:  2+ Peripheral Pulses, No clubbing, cyanosis, or edema        Vital Signs Last 24 Hrs  T(C): 36.3 (22 Aug 2019 05:37), Max: 37.4 (21 Aug 2019 20:36)  T(F): 97.4 (22 Aug 2019 05:37), Max: 99.4 (21 Aug 2019 20:36)  HR: 67 (22 Aug 2019 05:37) (61 - 68)  BP: 116/70 (22 Aug 2019 05:37) (116/70 - 140/89)  BP(mean): --  RR: 18 (22 Aug 2019 05:37) (17 - 18)  SpO2: --      Consultant(s) Notes Reviewed:  [X] YES  [ ] NO  Care Discussed with Consultants/Other Providers [X] YES  [ ] NO  Imaging Personally Reviewed:  [X] YES  [ ] NO      LABS:                        14.4   5.76  )-----------( 265      ( 21 Aug 2019 06:51 )             41.9     08-21    145  |  107  |  9<L>  ----------------------------<  94  4.7   |  27  |  1.3    Ca    9.3      21 Aug 2019 06:51            CAPILLARY BLOOD GLUCOSE

## 2019-08-22 NOTE — PROCEDURE NOTE - NSPROCDETAILS_GEN_ALL_CORE
supine position/sterile dressing applied/sterile technique, catheter placed/ultrasound guidance/location identified, draped/prepped, sterile technique used/ultrasound assessment

## 2019-08-26 DIAGNOSIS — N12 TUBULO-INTERSTITIAL NEPHRITIS, NOT SPECIFIED AS ACUTE OR CHRONIC: ICD-10-CM

## 2019-08-26 DIAGNOSIS — K57.32 DIVERTICULITIS OF LARGE INTESTINE WITHOUT PERFORATION OR ABSCESS WITHOUT BLEEDING: ICD-10-CM

## 2019-08-26 DIAGNOSIS — J45.909 UNSPECIFIED ASTHMA, UNCOMPLICATED: ICD-10-CM

## 2019-08-26 DIAGNOSIS — N17.9 ACUTE KIDNEY FAILURE, UNSPECIFIED: ICD-10-CM
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
## 2019-08-26 DIAGNOSIS — T39.395A ADVERSE EFFECT OF OTHER NONSTEROIDAL ANTI-INFLAMMATORY DRUGS [NSAID], INITIAL ENCOUNTER: ICD-10-CM

## 2019-08-26 DIAGNOSIS — R12 HEARTBURN: ICD-10-CM

## 2019-08-27 LAB
CULTURE RESULTS: SIGNIFICANT CHANGE UP
SPECIMEN SOURCE: SIGNIFICANT CHANGE UP

## 2019-09-04 ENCOUNTER — OUTPATIENT (OUTPATIENT)
Dept: OUTPATIENT SERVICES | Facility: HOSPITAL | Age: 44
LOS: 1 days | Discharge: HOME | End: 2019-09-04

## 2019-09-04 DIAGNOSIS — N18.1 CHRONIC KIDNEY DISEASE, STAGE 1: ICD-10-CM

## 2019-10-08 ENCOUNTER — APPOINTMENT (OUTPATIENT)
Dept: UROLOGY | Facility: CLINIC | Age: 44
End: 2019-10-08
Payer: COMMERCIAL

## 2019-10-08 VITALS — BODY MASS INDEX: 27.59 KG/M2 | HEIGHT: 74 IN | WEIGHT: 215 LBS

## 2019-10-08 DIAGNOSIS — Z78.9 OTHER SPECIFIED HEALTH STATUS: ICD-10-CM

## 2019-10-08 DIAGNOSIS — R39.9 UNSPECIFIED SYMPTOMS AND SIGNS INVOLVING THE GENITOURINARY SYSTEM: ICD-10-CM

## 2019-10-08 DIAGNOSIS — Z87.19 PERSONAL HISTORY OF OTHER DISEASES OF THE DIGESTIVE SYSTEM: ICD-10-CM

## 2019-10-08 DIAGNOSIS — N52.9 MALE ERECTILE DYSFUNCTION, UNSPECIFIED: ICD-10-CM

## 2019-10-08 DIAGNOSIS — J45.909 UNSPECIFIED ASTHMA, UNCOMPLICATED: ICD-10-CM

## 2019-10-08 PROCEDURE — 99203 OFFICE O/P NEW LOW 30 MIN: CPT

## 2019-10-08 RX ORDER — TADALAFIL 5 MG/1
5 TABLET ORAL
Refills: 0 | Status: ACTIVE | COMMUNITY

## 2019-10-08 RX ORDER — VARDENAFIL 10 MG/1
10 TABLET, FILM COATED ORAL DAILY
Qty: 5 | Refills: 6 | Status: ACTIVE | COMMUNITY
Start: 2019-10-08 | End: 1900-01-01

## 2019-10-08 RX ORDER — TADALAFIL 20 MG/1
20 TABLET, FILM COATED ORAL
Refills: 0 | Status: COMPLETED | COMMUNITY
End: 2019-10-08

## 2019-10-08 RX ORDER — TAMSULOSIN HYDROCHLORIDE 0.4 MG/1
0.4 CAPSULE ORAL
Qty: 90 | Refills: 3 | Status: ACTIVE | COMMUNITY
Start: 2019-10-08 | End: 1900-01-01

## 2019-10-08 RX ORDER — ALBUTEROL SULFATE 90 UG/1
AEROSOL, METERED RESPIRATORY (INHALATION)
Refills: 0 | Status: ACTIVE | COMMUNITY

## 2019-10-08 RX ORDER — TAMSULOSIN HYDROCHLORIDE 0.4 MG/1
0.4 CAPSULE ORAL DAILY
Qty: 90 | Refills: 1 | Status: ACTIVE | COMMUNITY
Start: 2019-10-08 | End: 1900-01-01

## 2019-10-08 NOTE — PHYSICAL EXAM
[Normal Appearance] : normal appearance [General Appearance - In No Acute Distress] : no acute distress [Edema] : no peripheral edema [] : no respiratory distress [FreeTextEntry1] : Refuses DEXTER [Normal Station and Gait] : the gait and station were normal for the patient's age [Oriented To Time, Place, And Person] : oriented to person, place, and time [Affect] : the affect was normal [Not Anxious] : not anxious

## 2019-10-08 NOTE — ASSESSMENT
[FreeTextEntry1] : Patient un happy with his voiding, he will start Tamsulosin 0.4 mg daily and f/u with Pierre for TRUS.Risk benefits alternatives discussed including dizziness, dry ejaculation, nasal congestion. Regarding erectile dysfunction he would like a different medication and agrees to try Levitra [Urinary Symptom or Sign (788.99\R39.89)] : implantation

## 2019-10-08 NOTE — HISTORY OF PRESENT ILLNESS
[Currently Experiencing ___] :  [unfilled] [Urinary Retention] : urinary retention [Urinary Frequency] : urinary frequency [Urinary Urgency] : urinary urgency [Weak Stream] : weak stream [Straining] : straining [Intermittency] : intermittency [Post-Void Dribbling] : post-void dribbling [Erectile Dysfunction] : Erectile Dysfunction [None] : None [FreeTextEntry1] : ELENI BLANKENSHIP is a 44 year old male with a past medical history of asthma. Presents to the office today for LUTS x 7 years worsening over the last two years. Patient has frequency, urgency, intermittency, weak stream, and straining upon urination.also ed Previously on Cialis 20 mg intermittently prescribed by PMD x 6 years, patient was taken as needed but had migraines so he stopped 1 year ago. Denies gross hematuria, flank pain, dysuria, fever, chills, or n/v. IPSS score is 28- UNHAPPY. In addition, patient has ED x approx 7 years. weak erections and difficulty maintaining an erection. Currently on Tadalafil 1/4 tablet as needed with good results. Patient was recently hospitalized for diverticulitis on 8/2019 and was found to have elevated creatinine, it was repeated before discharge and creatinine went down. Last creatinine was on 9/4/2019 and it was 0.9.An US was done on 8/20/2019 and prostate volume 12.5 cc and PVR 0 cc. No renal calculus, hydronephrosis seen on US.  \par  [Urinary Incontinence] : no urinary incontinence [Nocturia] : no nocturia [Dysuria] : no dysuria [Hematuria - Gross] : no gross hematuria

## 2019-10-30 ENCOUNTER — APPOINTMENT (OUTPATIENT)
Dept: UROLOGY | Facility: CLINIC | Age: 44
End: 2019-10-30
Payer: COMMERCIAL

## 2019-10-30 PROCEDURE — 76872 US TRANSRECTAL: CPT

## 2020-08-09 NOTE — ED PROVIDER NOTE - CHILD ABUSE FACILITY
VIRTUAL NURSE:  Cued into patient's room.  Permission received per patient to turn camera to view patient.  Introduced as VN for night shift that will be working with floor nurse and nursing assistant.  Educated patient on VN's role in patient care and  [facility] model.  Plan of care reviewed with patient.  Education per flowsheet.   Informed patient that staff will round on them every 2 hours but to use call light for any other needs they may have; informed of fall risk and fall precautions.  Patient verbalized understanding.  Call light within reach; bed siderails up x3.  Opportunity given for questions and questions answered.  Patient denies complaints or any needs at this time.  Will cont to monitor and intervene as needed.     [facility]

## 2020-09-29 ENCOUNTER — APPOINTMENT (OUTPATIENT)
Dept: OTOLARYNGOLOGY | Facility: CLINIC | Age: 45
End: 2020-09-29
Payer: COMMERCIAL

## 2020-09-29 DIAGNOSIS — L29.9 PRURITUS, UNSPECIFIED: ICD-10-CM

## 2020-09-29 DIAGNOSIS — R09.81 NASAL CONGESTION: ICD-10-CM

## 2020-09-29 DIAGNOSIS — J30.9 ALLERGIC RHINITIS, UNSPECIFIED: ICD-10-CM

## 2020-09-29 DIAGNOSIS — H60.60 UNSPECIFIED CHRONIC OTITIS EXTERNA, UNSPECIFIED EAR: ICD-10-CM

## 2020-09-29 PROCEDURE — 99204 OFFICE O/P NEW MOD 45 MIN: CPT | Mod: 25

## 2020-09-29 PROCEDURE — 31231 NASAL ENDOSCOPY DX: CPT

## 2020-09-29 RX ORDER — AZELASTINE HYDROCHLORIDE 205.5 UG/1
0.15 SPRAY, METERED NASAL
Qty: 2 | Refills: 6 | Status: ACTIVE | COMMUNITY
Start: 2020-09-29 | End: 1900-01-01

## 2020-09-29 RX ORDER — LEVOCETIRIZINE DIHYDROCHLORIDE 5 MG/1
5 TABLET ORAL DAILY
Qty: 30 | Refills: 3 | Status: ACTIVE | COMMUNITY
Start: 2020-09-29 | End: 1900-01-01

## 2020-09-29 RX ORDER — FLUTICASONE PROPIONATE 50 UG/1
50 SPRAY, METERED NASAL DAILY
Qty: 1 | Refills: 6 | Status: ACTIVE | COMMUNITY
Start: 2020-09-29 | End: 1900-01-01

## 2020-09-29 NOTE — REVIEW OF SYSTEMS
[As Noted in HPI] : as noted in HPI [Negative] : Heme/Lymph [FreeTextEntry1] : all other ros negative

## 2020-09-29 NOTE — PHYSICAL EXAM
[Midline] : trachea located in midline position [Normal] : no rashes [de-identified] : right external ear eczema

## 2020-09-29 NOTE — PROCEDURE
[Recalcitrant Symptoms] : recalcitrant symptoms  [None] : none [Flexible Endoscope] : examined with the flexible endoscope [Congested] : congested [Allergic] : allergic signs [Normal] : the paranasal sinuses had no abnormalities

## 2020-09-29 NOTE — HISTORY OF PRESENT ILLNESS
[de-identified] : Patient presents today with c/o eczema of the ears and chronic rhinitis. \par Patient admits suffering from frequent sinusitis for years. Patient suffers from excessive mucus, cough. Headaches are frequent. Sinus pressure. Currently he is using Allegra D to dry him out. He also suffers from allergies. \par \par Patient also c/o itching of the ears. No otalgia. He admits constant itching.

## 2020-11-12 ENCOUNTER — APPOINTMENT (OUTPATIENT)
Dept: OTOLARYNGOLOGY | Facility: CLINIC | Age: 45
End: 2020-11-12

## 2021-09-02 NOTE — ED PROVIDER NOTE - CHIEF COMPLAINT
PDMP reviewed; no aberrant behavior identified, prescription authorized.     The patient is a 44y Male complaining of abdominal pain.

## 2021-09-14 RX ORDER — FLUOCINOLONE ACETONIDE 0.11 MG/ML
0.01 OIL AURICULAR (OTIC) DAILY
Qty: 1 | Refills: 3 | Status: ACTIVE | COMMUNITY
Start: 2020-09-29 | End: 1900-01-01

## 2021-09-14 RX ORDER — MOMETASONE FUROATE 1 MG/G
0.1 CREAM TOPICAL TWICE DAILY
Qty: 1 | Refills: 1 | Status: ACTIVE | COMMUNITY
Start: 2020-09-29 | End: 1900-01-01

## 2021-10-13 ENCOUNTER — RX RENEWAL (OUTPATIENT)
Age: 46
End: 2021-10-13

## 2021-10-27 ENCOUNTER — APPOINTMENT (OUTPATIENT)
Dept: OTOLARYNGOLOGY | Facility: CLINIC | Age: 46
End: 2021-10-27

## 2021-11-17 ENCOUNTER — RX RENEWAL (OUTPATIENT)
Age: 46
End: 2021-11-17

## 2021-11-17 RX ORDER — MONTELUKAST 10 MG/1
10 TABLET, FILM COATED ORAL DAILY
Qty: 30 | Refills: 0 | Status: ACTIVE | COMMUNITY
Start: 2020-09-29 | End: 1900-01-01

## 2022-01-04 NOTE — ED PROVIDER NOTE - CONSTITUTIONAL, MLM
GENERAL SURGERY PROGRESS NOTE    OPERATION: LEFT BELOW KNEE AMPUTATION, 11/28/21 by Dr. Nguyen     - Gangrene of left foot and lymphedema of lower leg. Good coaptation of posterior skin flaps despite lymphedema the changes.  Healthy well perfused posterior skin flap.    Patient seen in her inpatient hospital room for 6 week follow-up of left BKA. Every other staple removed 2 weeks ago. Since then she has been cleaning the incision daily with soap and water. She has some phantom sensation but no significant pain and no concerns with the flap. She it still awaiting acceptance into rehabilitation program.    Exam: flap is stable, soft. No erythema, warmth, dehiscence, drainage. Staples present. Good movement and flexion of stump.     Plan:  1. All remaining staples removed, patient tolerated well.  2. Continue to work on rehab placement.   3. No further surgical follow-up necessary. Please notify Dr. Nguyen if the incision/flap deteriorates or look suspicious.  Thank you.       Lorenzo Butler PA-C  Acute Care Surgery  Pager: [phone]    After 5 PM please page the on call surgeon for the Acute Care Surgery team at [phone] with any emergent concerns.    I agree Dr. Nguyen   normal... Well appearing, well nourished, awake, alert, oriented to person, place, time/situation and in no apparent distress.

## 2022-02-03 ENCOUNTER — INPATIENT (INPATIENT)
Facility: HOSPITAL | Age: 47
LOS: 3 days | Discharge: HOME HEALTH | End: 2022-02-07
Attending: HOSPITALIST | Admitting: HOSPITALIST
Payer: COMMERCIAL

## 2022-02-03 VITALS
RESPIRATION RATE: 19 BRPM | WEIGHT: 220.02 LBS | OXYGEN SATURATION: 98 % | HEART RATE: 83 BPM | TEMPERATURE: 99 F | HEIGHT: 72 IN | SYSTOLIC BLOOD PRESSURE: 126 MMHG | DIASTOLIC BLOOD PRESSURE: 68 MMHG

## 2022-02-03 DIAGNOSIS — K57.92 DIVERTICULITIS OF INTESTINE, PART UNSPECIFIED, WITHOUT PERFORATION OR ABSCESS WITHOUT BLEEDING: ICD-10-CM

## 2022-02-03 DIAGNOSIS — T78.40XA ALLERGY, UNSPECIFIED, INITIAL ENCOUNTER: ICD-10-CM

## 2022-02-03 DIAGNOSIS — J45.909 UNSPECIFIED ASTHMA, UNCOMPLICATED: ICD-10-CM

## 2022-02-03 LAB
ALBUMIN SERPL ELPH-MCNC: 5 G/DL — SIGNIFICANT CHANGE UP (ref 3.5–5.2)
ALP SERPL-CCNC: 66 U/L — SIGNIFICANT CHANGE UP (ref 30–115)
ALT FLD-CCNC: 15 U/L — SIGNIFICANT CHANGE UP (ref 0–41)
ANION GAP SERPL CALC-SCNC: 12 MMOL/L — SIGNIFICANT CHANGE UP (ref 7–14)
APPEARANCE UR: CLEAR — SIGNIFICANT CHANGE UP
AST SERPL-CCNC: 14 U/L — SIGNIFICANT CHANGE UP (ref 0–41)
BACTERIA # UR AUTO: NEGATIVE — SIGNIFICANT CHANGE UP
BASOPHILS # BLD AUTO: 0.07 K/UL — SIGNIFICANT CHANGE UP (ref 0–0.2)
BASOPHILS NFR BLD AUTO: 0.6 % — SIGNIFICANT CHANGE UP (ref 0–1)
BILIRUB SERPL-MCNC: 0.5 MG/DL — SIGNIFICANT CHANGE UP (ref 0.2–1.2)
BILIRUB UR-MCNC: NEGATIVE — SIGNIFICANT CHANGE UP
BUN SERPL-MCNC: 11 MG/DL — SIGNIFICANT CHANGE UP (ref 10–20)
CALCIUM SERPL-MCNC: 9.6 MG/DL — SIGNIFICANT CHANGE UP (ref 8.5–10.1)
CHLORIDE SERPL-SCNC: 102 MMOL/L — SIGNIFICANT CHANGE UP (ref 98–110)
CO2 SERPL-SCNC: 26 MMOL/L — SIGNIFICANT CHANGE UP (ref 17–32)
COLOR SPEC: SIGNIFICANT CHANGE UP
CREAT SERPL-MCNC: 1 MG/DL — SIGNIFICANT CHANGE UP (ref 0.7–1.5)
DIFF PNL FLD: NEGATIVE — SIGNIFICANT CHANGE UP
EOSINOPHIL # BLD AUTO: 0.15 K/UL — SIGNIFICANT CHANGE UP (ref 0–0.7)
EOSINOPHIL NFR BLD AUTO: 1.3 % — SIGNIFICANT CHANGE UP (ref 0–8)
EPI CELLS # UR: 0 /HPF — SIGNIFICANT CHANGE UP (ref 0–5)
GLUCOSE SERPL-MCNC: 96 MG/DL — SIGNIFICANT CHANGE UP (ref 70–99)
GLUCOSE UR QL: NEGATIVE — SIGNIFICANT CHANGE UP
HCT VFR BLD CALC: 48.5 % — SIGNIFICANT CHANGE UP (ref 42–52)
HGB BLD-MCNC: 16.5 G/DL — SIGNIFICANT CHANGE UP (ref 14–18)
HYALINE CASTS # UR AUTO: 1 /LPF — SIGNIFICANT CHANGE UP (ref 0–7)
IMM GRANULOCYTES NFR BLD AUTO: 0.3 % — SIGNIFICANT CHANGE UP (ref 0.1–0.3)
KETONES UR-MCNC: NEGATIVE — SIGNIFICANT CHANGE UP
LACTATE SERPL-SCNC: 1.1 MMOL/L — SIGNIFICANT CHANGE UP (ref 0.7–2)
LEUKOCYTE ESTERASE UR-ACNC: NEGATIVE — SIGNIFICANT CHANGE UP
LYMPHOCYTES # BLD AUTO: 1.62 K/UL — SIGNIFICANT CHANGE UP (ref 1.2–3.4)
LYMPHOCYTES # BLD AUTO: 13.9 % — LOW (ref 20.5–51.1)
MCHC RBC-ENTMCNC: 30.1 PG — SIGNIFICANT CHANGE UP (ref 27–31)
MCHC RBC-ENTMCNC: 34 G/DL — SIGNIFICANT CHANGE UP (ref 32–37)
MCV RBC AUTO: 88.5 FL — SIGNIFICANT CHANGE UP (ref 80–94)
MONOCYTES # BLD AUTO: 0.51 K/UL — SIGNIFICANT CHANGE UP (ref 0.1–0.6)
MONOCYTES NFR BLD AUTO: 4.4 % — SIGNIFICANT CHANGE UP (ref 1.7–9.3)
NEUTROPHILS # BLD AUTO: 9.29 K/UL — HIGH (ref 1.4–6.5)
NEUTROPHILS NFR BLD AUTO: 79.5 % — HIGH (ref 42.2–75.2)
NITRITE UR-MCNC: NEGATIVE — SIGNIFICANT CHANGE UP
NRBC # BLD: 0 /100 WBCS — SIGNIFICANT CHANGE UP (ref 0–0)
PH UR: 8.5 — HIGH (ref 5–8)
PLATELET # BLD AUTO: 267 K/UL — SIGNIFICANT CHANGE UP (ref 130–400)
POTASSIUM SERPL-MCNC: 4.7 MMOL/L — SIGNIFICANT CHANGE UP (ref 3.5–5)
POTASSIUM SERPL-SCNC: 4.7 MMOL/L — SIGNIFICANT CHANGE UP (ref 3.5–5)
PROT SERPL-MCNC: 7.5 G/DL — SIGNIFICANT CHANGE UP (ref 6–8)
PROT UR-MCNC: ABNORMAL
RBC # BLD: 5.48 M/UL — SIGNIFICANT CHANGE UP (ref 4.7–6.1)
RBC # FLD: 12.5 % — SIGNIFICANT CHANGE UP (ref 11.5–14.5)
RBC CASTS # UR COMP ASSIST: 1 /HPF — SIGNIFICANT CHANGE UP (ref 0–4)
SARS-COV-2 RNA SPEC QL NAA+PROBE: SIGNIFICANT CHANGE UP
SODIUM SERPL-SCNC: 140 MMOL/L — SIGNIFICANT CHANGE UP (ref 135–146)
SP GR SPEC: >1.05 (ref 1.01–1.03)
UROBILINOGEN FLD QL: SIGNIFICANT CHANGE UP
WBC # BLD: 11.68 K/UL — HIGH (ref 4.8–10.8)
WBC # FLD AUTO: 11.68 K/UL — HIGH (ref 4.8–10.8)
WBC UR QL: 0 /HPF — SIGNIFICANT CHANGE UP (ref 0–5)

## 2022-02-03 PROCEDURE — 99406 BEHAV CHNG SMOKING 3-10 MIN: CPT

## 2022-02-03 PROCEDURE — 99223 1ST HOSP IP/OBS HIGH 75: CPT | Mod: 25

## 2022-02-03 PROCEDURE — 76870 US EXAM SCROTUM: CPT | Mod: 26

## 2022-02-03 PROCEDURE — 74177 CT ABD & PELVIS W/CONTRAST: CPT | Mod: 26,MA

## 2022-02-03 PROCEDURE — 99285 EMERGENCY DEPT VISIT HI MDM: CPT

## 2022-02-03 RX ORDER — SODIUM CHLORIDE 9 MG/ML
1000 INJECTION, SOLUTION INTRAVENOUS
Refills: 0 | Status: DISCONTINUED | OUTPATIENT
Start: 2022-02-03 | End: 2022-02-06

## 2022-02-03 RX ORDER — IPRATROPIUM/ALBUTEROL SULFATE 18-103MCG
3 AEROSOL WITH ADAPTER (GRAM) INHALATION EVERY 6 HOURS
Refills: 0 | Status: DISCONTINUED | OUTPATIENT
Start: 2022-02-03 | End: 2022-02-07

## 2022-02-03 RX ORDER — CIPROFLOXACIN LACTATE 400MG/40ML
400 VIAL (ML) INTRAVENOUS ONCE
Refills: 0 | Status: COMPLETED | OUTPATIENT
Start: 2022-02-03 | End: 2022-02-03

## 2022-02-03 RX ORDER — ALBUTEROL 90 UG/1
2 AEROSOL, METERED ORAL EVERY 6 HOURS
Refills: 0 | Status: DISCONTINUED | OUTPATIENT
Start: 2022-02-03 | End: 2022-02-07

## 2022-02-03 RX ORDER — METRONIDAZOLE 500 MG
500 TABLET ORAL ONCE
Refills: 0 | Status: COMPLETED | OUTPATIENT
Start: 2022-02-03 | End: 2022-02-03

## 2022-02-03 RX ORDER — KETOROLAC TROMETHAMINE 30 MG/ML
30 SYRINGE (ML) INJECTION EVERY 8 HOURS
Refills: 0 | Status: DISCONTINUED | OUTPATIENT
Start: 2022-02-03 | End: 2022-02-07

## 2022-02-03 RX ORDER — BUDESONIDE AND FORMOTEROL FUMARATE DIHYDRATE 160; 4.5 UG/1; UG/1
2 AEROSOL RESPIRATORY (INHALATION)
Refills: 0 | Status: DISCONTINUED | OUTPATIENT
Start: 2022-02-03 | End: 2022-02-03

## 2022-02-03 RX ORDER — SODIUM CHLORIDE 9 MG/ML
1000 INJECTION INTRAMUSCULAR; INTRAVENOUS; SUBCUTANEOUS ONCE
Refills: 0 | Status: COMPLETED | OUTPATIENT
Start: 2022-02-03 | End: 2022-02-03

## 2022-02-03 RX ORDER — CIPROFLOXACIN LACTATE 400MG/40ML
400 VIAL (ML) INTRAVENOUS EVERY 12 HOURS
Refills: 0 | Status: DISCONTINUED | OUTPATIENT
Start: 2022-02-03 | End: 2022-02-04

## 2022-02-03 RX ORDER — LANOLIN ALCOHOL/MO/W.PET/CERES
3 CREAM (GRAM) TOPICAL AT BEDTIME
Refills: 0 | Status: DISCONTINUED | OUTPATIENT
Start: 2022-02-03 | End: 2022-02-07

## 2022-02-03 RX ORDER — ONDANSETRON 8 MG/1
4 TABLET, FILM COATED ORAL EVERY 8 HOURS
Refills: 0 | Status: DISCONTINUED | OUTPATIENT
Start: 2022-02-03 | End: 2022-02-07

## 2022-02-03 RX ORDER — KETOROLAC TROMETHAMINE 30 MG/ML
15 SYRINGE (ML) INJECTION ONCE
Refills: 0 | Status: DISCONTINUED | OUTPATIENT
Start: 2022-02-03 | End: 2022-02-03

## 2022-02-03 RX ORDER — ACETAMINOPHEN 500 MG
650 TABLET ORAL EVERY 6 HOURS
Refills: 0 | Status: DISCONTINUED | OUTPATIENT
Start: 2022-02-03 | End: 2022-02-07

## 2022-02-03 RX ORDER — KETOROLAC TROMETHAMINE 30 MG/ML
15 SYRINGE (ML) INJECTION EVERY 8 HOURS
Refills: 0 | Status: DISCONTINUED | OUTPATIENT
Start: 2022-02-03 | End: 2022-02-03

## 2022-02-03 RX ORDER — PANTOPRAZOLE SODIUM 20 MG/1
40 TABLET, DELAYED RELEASE ORAL
Refills: 0 | Status: DISCONTINUED | OUTPATIENT
Start: 2022-02-03 | End: 2022-02-07

## 2022-02-03 RX ORDER — FEXOFENADINE HCL 30 MG
1 TABLET ORAL
Qty: 0 | Refills: 0 | DISCHARGE

## 2022-02-03 RX ORDER — SODIUM CHLORIDE 9 MG/ML
1000 INJECTION, SOLUTION INTRAVENOUS
Refills: 0 | Status: DISCONTINUED | OUTPATIENT
Start: 2022-02-03 | End: 2022-02-03

## 2022-02-03 RX ORDER — METRONIDAZOLE 500 MG
500 TABLET ORAL EVERY 8 HOURS
Refills: 0 | Status: DISCONTINUED | OUTPATIENT
Start: 2022-02-03 | End: 2022-02-07

## 2022-02-03 RX ADMIN — Medication 100 MILLIGRAM(S): at 22:11

## 2022-02-03 RX ADMIN — SODIUM CHLORIDE 1000 MILLILITER(S): 9 INJECTION INTRAMUSCULAR; INTRAVENOUS; SUBCUTANEOUS at 10:25

## 2022-02-03 RX ADMIN — Medication 100 MILLIGRAM(S): at 12:51

## 2022-02-03 RX ADMIN — SODIUM CHLORIDE 100 MILLILITER(S): 9 INJECTION, SOLUTION INTRAVENOUS at 22:10

## 2022-02-03 RX ADMIN — SODIUM CHLORIDE 120 MILLILITER(S): 9 INJECTION, SOLUTION INTRAVENOUS at 12:53

## 2022-02-03 RX ADMIN — Medication 15 MILLIGRAM(S): at 13:05

## 2022-02-03 RX ADMIN — Medication 200 MILLIGRAM(S): at 22:11

## 2022-02-03 RX ADMIN — Medication 200 MILLIGRAM(S): at 12:52

## 2022-02-03 NOTE — H&P ADULT - NSHPSOCIALHISTORY_GEN_ALL_CORE
Substance Use (street drugs): (x) never used  (  ) other:  Tobacco Usage:  (  ) never smoked   (   ) former smoker   (  X ) current smoker  - smokes marijuana daily (     ) pack year  Alcohol Usage: Occasional

## 2022-02-03 NOTE — ED ADULT NURSE NOTE - NS PRO PASSIVE SMOKE EXP
November 20, 2020    Sena Daniel  [address]  [address]  [place] [address] [address] [address]  Phone: [phone] To whom it may concern:    Sena Daniel was a patient at [facility] from 11/17/20 to 11/20/20.        Unknown

## 2022-02-03 NOTE — ED PROVIDER NOTE - OBJECTIVE STATEMENT
45 yo M pmhx asthma, diverticulitis, no shx presenting to the ED for evaluation of constant, sharp, worsening LLQ pain x 1 week, pt started on cipro/flagyl outpatient GI, completed course with no relief of pain. Pt endorses he changed his diet and still has pain. Denies any alleviating or provoking factors. Pt endorses b/l achy sensation to testicles for weeks. Denies fever, chills, nausea, vomiting, diarrhea, chest pain, sob, dysuria, hematuria, penile discharge.

## 2022-02-03 NOTE — ED PROVIDER NOTE - PHYSICAL EXAMINATION
GENERAL: Well-nourished, Well-developed. NAD.  HEAD: No visible or palpable bumps or hematomas. No ecchymosis behind ears B/L.  Eyes: PERRLA, EOMI. No asymmetry. No nystagmus. No conjunctival injection. Non-icteric sclera.  ENMT: MMM.   Neck: Supple. FROM  CVS: RRR. Normal S1,S2. No murmurs appreciated on auscultation   RESP: No use of accessory muscles. Chest rise symmetrical with good expansion. Lungs clear to auscultation B/L. No wheezing, rales, or rhonchi auscultated.  GI: Normal auscultation of bowel sounds in all 4 quadrants. (+)mild llq ttp. Soft, Nondistended. No guarding or rebound tenderness. No CVAT B/L.  Skin: Warm, Dry. No rashes or lesions. Good cap refill < 2 sec B/L.  : uncircumcised, external genitalia normal, Testes non-tender and descended B/L. No penile sores. no hernias. performed by Dr beck

## 2022-02-03 NOTE — ED PROVIDER NOTE - NS ED ROS FT
Constitutional: (-) fever (-) malaise (-) diaphoresis (-) chills   Eyes: (-) visual changes (-) eye pain (-) eye discharge (-) photophobia (-) FB sensation  Cardiac: (-) chest pain  (-) palpitations (-) syncope (-) edema  Respiratory: (-) cough (-) SOB (-) TALAVERA  GI: (-) nausea (-) vomiting (-) diarrhea (+) abdominal pain   : (-) dysuria (-) increased frequency  (-) hematuria (-) incontinence (+)testicular pain  Neuro: (-) headache (-) dizziness (-) numbness/tingling to extremities B/L (-) weakness  Skin: (-) rash (-) laceration    Except as documented in the HPI, all other systems are negative.

## 2022-02-03 NOTE — H&P ADULT - ATTENDING COMMENTS
46/M with Asthma, diverticulosis with frequent episodes of diverticulitis (1-2 episodes/year for last 20 years), presenting to the ED with c/o Persistent abdominal pain despite completion of outpatient antibiotic regimen. Patient reports onset of symptoms about 2-3 weeks ago where he was given flagyl and another antibiotics (as cipro was not available in pharmacy due to shortage), he completed 7 days course, with partial improvement. Last night he woke up with severe LLQ pain brining him to hospital.     Labs and scans: personally reviewed.     Physical exam:   General: not in distress  HEENT: ATNC  LUNGS: CTAB, no wheezing, rales, rhonchi  HEART: RRR, +S1,S2  ABDOMEN: LLQ tenderness  EXT: Warm, well perfused    NEURO: AxOx3 , non focal     Plan:    # Acute sigmoid diverticulitis  - LLQ pain for 2-3 weeks; worsening for 1 day  - CT abdomen and pelvis - acute sigmoid diverticulitis  - s/p metro and alternative to cipro   - had previous surgery eval - about 2 years ago, surgery was not recommended at that time   - will start on cipro and metro iv   - surgery consult for recurrent diverticulitis - he has about 2 episodes/year over last 20 years  - clear liquid diet for now --> advance as tolerated   - serial abdominal exam     # Asthma/allergies - not in exacerbation    # Need for Prophylaxis measures - IMPROVE score of 0, no need for chemical anticoagulation 46/M with Asthma, diverticulosis with frequent episodes of diverticulitis (1-2 episodes/year for last 20 years), presenting to the ED with c/o Persistent abdominal pain despite completion of outpatient antibiotic regimen. Patient reports onset of symptoms about 2-3 weeks ago where he was given flagyl and another antibiotics (as cipro was not available in pharmacy due to shortage), he completed 7 days course, with partial improvement. Last night he woke up with severe LLQ pain brining him to hospital.     Labs and scans: personally reviewed.     Physical exam:   General: not in distress  HEENT: ATNC  LUNGS: CTAB, no wheezing, rales, rhonchi  HEART: RRR, +S1,S2  ABDOMEN: LLQ tenderness  EXT: Warm, well perfused    NEURO: AxOx3 , non focal     Plan:    # Acute sigmoid diverticulitis  - LLQ pain for 2-3 weeks; worsening for 1 day  - CT abdomen and pelvis - acute sigmoid diverticulitis  - s/p metro and alternative to cipro   - had previous surgery eval - about 2 years ago, surgery was not recommended at that time   - will start on cipro and metro iv   - surgery consult for recurrent diverticulitis - he has about 2 episodes/year over last 20 years  - clear liquid diet for now --> advance as tolerated   - serial abdominal exam     # Marijuana use - patient smokes weed daily, counselled about cessation     # Asthma/allergies - not in exacerbation    # Need for Prophylaxis measures - IMPROVE score of 0, no need for chemical anticoagulation

## 2022-02-03 NOTE — PATIENT PROFILE ADULT - HEALTH LITERACY
S: c/o of left hip pain and bilateral hip AVN. He states his left hip collapsed about 3 years ago and has had pain and arthritis since. He states his pain comes and goes and for the most part is achy and his femur will throb and feels heavy. Pain is a 6/10 at its worst. He denies anything that makes it better. He has tried PT.      Patient has a history of a few years of hip problems.  He had had issues with alcohol abuse a number of years ago and developed pancreatitis.  Since then he has stopped drinking he has lost quite a bit of weight and he has reduced his hemoglobin A1c remarkably.  He had visited the [facility] and had discussion about total hip arthroplasty for the left hip and core decompression for the right hip.  He has not had any interventions at this time.  Patient still has left hip groin and buttock pain.  At times she has intermittent right hip discomfort.  Otherwise doing well.  No recent history of chills or fever.         Patient Active Problem List   Diagnosis     Hx of diabetes mellitus     Alcohol dependence in remission (H)     Microalbuminuria     Essential hypertension, benign     Acute reaction to stress     Attention deficit hyperactivity disorder (ADHD), combined type     Chronic kidney disease, stage 1     AVN (avascular necrosis of bone) (H)     Chronic pain of left knee     Vitamin D deficiency            Past Medical History:   Diagnosis Date     Acute pancreatitis 10/20/2016     Alcohol abuse      Hx of diabetes mellitus 10/26/2016    Occurred post alcoholic pancreatitis. Resolved 1 yr later after alcohol cessation.       Hypertension 10.24.16            Past Surgical History:   Procedure Laterality Date     HERNIORRHAPHY INGUINAL Left 3/30/2018    Procedure: HERNIORRHAPHY INGUINAL;  Left Inguinal hernia repair with Mesh ;  Surgeon: Eduard Amaral MD;  Location:  OR     [facility]              Social History     Tobacco Use     Smoking status: Never Smoker     Smokeless  tobacco: Never Used   Substance Use Topics     Alcohol use: No     Alcohol/week: 0.0 standard drinks            Family History   Problem Relation Age of Onset     Breast Cancer Mother      Hypertension Father      Diabetes Type 2  Paternal Grandfather      Hypertension Paternal Grandfather      Diabetes Paternal Grandfather      Other Cancer Maternal Grandmother              No Known Allergies         Current Outpatient Medications   Medication Sig Dispense Refill     amphetamine-dextroamphetamine (ADDERALL XR) 15 MG 24 hr capsule Take 1 capsule (15 mg) by mouth daily 30 capsule 0     [START ON 1/21/2022] amphetamine-dextroamphetamine (ADDERALL XR) 15 MG 24 hr capsule Take 1 capsule (15 mg) by mouth daily 30 capsule 0     [START ON 2/21/2022] amphetamine-dextroamphetamine (ADDERALL XR) 15 MG 24 hr capsule Take 1 capsule (15 mg) by mouth daily 30 capsule 0     amphetamine-dextroamphetamine (ADDERALL XR) 15 MG 24 hr capsule Take 1 capsule (15 mg) by mouth daily 30 capsule 0     amphetamine-dextroamphetamine (ADDERALL XR) 15 MG 24 hr capsule Take 1 capsule (15 mg) by mouth daily 30 capsule 0     lisinopril (ZESTRIL) 2.5 MG tablet Take 1 tablet (2.5 mg) by mouth daily 90 tablet 0     vitamin D3 (CHOLECALCIFEROL) 1.25 MG (44679 UT) capsule Take 1 capsule (50,000 Units) by mouth every 7 days 8 capsule 0          Review Of Systems  Skin: negative  Eyes: negative  Ears/Nose/Throat: negative  Respiratory: No shortness of breath, dyspnea on exertion, cough, or hemoptysis    O: Physical examination: Supple right hip without crepitus and no limits to internal rotation, external rotation, flexion, extension.  Circulation motor and sensory intact to both lower extremities and no tension signs.  Left lower extremity patient is limited in internal rotation.  He also has some limitation to abduction.  Adequate flexion adequate extension.  No crepitus with motion.  Also neither of these maneuvers to right or left hip exacerbate his  symptoms.    Laboratory: Vitamin D 25-hydroxy 15.  Urine albumin markedly elevated.  No recent inflammatory markers nor are there any arthritic profile studies.  Last uric acid was 2018.  Most recent hemoglobin A1c 5.3.    Images:IMPRESSION: Large area of chronic avascular necrosis in the left  femoral head. There has been mild collapse of the superior medial  articular surface with subchondral sclerosis in the femoral head. Mild  narrowing and hypertrophic change in the left hip joint are new.  Stable small area of chronic avascular necrosis in the right femoral  head. Normal alignment of the right hip joint. The SI joints are  unremarkable.         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  Stage I AVN right hip.  History of EtOH abuse as well as elevated hemoglobin A1c and pancreatitis but this has since resolved as he has changed his lifestyle.  He is also lost quite a bit of weight.  He is active and would like to continue with activity.  He had had discussion about total hip arthroplasty left lower extremity and core decompression right hip sometime ago but never followed through with intervention.    Plan: Patient is to have laboratories to include inflammatory markers and arthritic profile.  We will also have MRI scan of the pelvis to include both hips.  Would like to go ahead and stage the hips again at this point.  We will see him back after his studies.  He can return to activities as tolerated.  We discussed interventions such as total hip arthroplasty and core decompression.  Of also discussed utilization of aspirin with the history of avascular necrosis as this may help to prevent progression.  We have also discussed his hypovitaminosis D and he is already taking 50,000 IU once weekly for 3 months but I will also suggest that he add 5000 IU of D3 daily.  We will see him back after studies.  He will notify us if there are any exacerbation of symptoms.           In  addition to the above assessment and plan each active problem on Sarbjit's problem list was evaluated today. This included the questioning of Sarbjit for any medication problems. We will continue the current treatment plan for these active problems except as noted.     no

## 2022-02-03 NOTE — H&P ADULT - NSHPPHYSICALEXAM_GEN_ALL_CORE
Vitals:  T(C): 37.3 (02-03-22 @ 09:56), Max: 37.3 (02-03-22 @ 09:56)  T(F): 99.1 (02-03-22 @ 09:56), Max: 99.1 (02-03-22 @ 09:56)  HR: 83 (02-03-22 @ 09:56) (83 - 83)  BP: 126/68 (02-03-22 @ 09:56) (126/68 - 126/68)  RR: 19 (02-03-22 @ 09:56) (19 - 19)  SpO2: 98% (02-03-22 @ 09:56) (98% - 98%)      General: No acute distress; Pallor (-), Icterus (-), Cyanosis (-), Clubbing (-)  HEENT: Normocephalic, atraumatic, PERRLA, EOMI  PULM: Bilaterally equal and clear breath sounds, wheeze (-), rubs (-), crackles (-)  CVS: Normal S1 and S2, murmurs (-), rubs (-), gallops (-)   GI: Soft, nondistended, tenderness to deep palpation on the LLQ, BS +  MSK: Edema (-), no muscle, bone or joint tenderness noted  SKIN: Warm and well perfused, no rashes noted  NEURO:  Alert and Oriented x 3; No gross focal neurological deficit noted

## 2022-02-03 NOTE — H&P ADULT - ASSESSMENT
47 yo M with PMH of Asthma, diverticulosis with frequent episodes of diverticulitis, presenting to the ED with c/o Persistent abdominal pain despite completion of outpatient antibiotic regimen.     # Acute sigmoid diverticulitis  - No sepsis on presentation  - LLQ pain for 2-3 weeks; worsening for 1 day  - CT abdomen and pelvis - acute sigmoid diverticulitis  - No abscess or perforation  - Clear liquid diet and IV hydration.  - Monitor BMP, LFT  - Supportive care : Pain control (toradol, tylenol) and antiemetics PRN  - Send blood cultures if febrile or hemodynamic instability  - Start on IV metronidazole and ciprofloxacin  - Advance diet as tolerated after resolution of pain  - Once tolerating diet, can switch to PO antibiotics  - If patient fails to improve within 48 to 72 hrs or shows any signs of worsening get CT abdomen and pelvis with contrast to rule out abscess or perforation.    # Asthma  - well controlled  - symbicort PRN    # Diet: Clear liquids  # Activity: IAT  # GI PPX: PPI  # DVT PPX: Not indicated  # Full Code   47 yo M with PMH of Asthma, diverticulosis with frequent episodes of diverticulitis, presenting to the ED with c/o Persistent abdominal pain despite completion of outpatient antibiotic regimen.     # Acute sigmoid diverticulitis  - No sepsis on presentation  - LLQ pain for 2-3 weeks; worsening for 1 day  - CT abdomen and pelvis - acute sigmoid diverticulitis  - No abscess or perforation  - Clear liquid diet and IV hydration.  - Monitor BMP, LFT  - Supportive care : Pain control (toradol, tylenol) and antiemetics PRN  - Send blood cultures if febrile or hemodynamic instability  - Start on IV metronidazole and ciprofloxacin  - Advance diet as tolerated after resolution of pain  - Once tolerating diet, can switch to PO antibiotics  - If patient fails to improve within 48 to 72 hrs or shows any signs of worsening get CT abdomen and pelvis with contrast to rule out abscess or perforation.  - OP GI follow up after resolution of acute episode    # Asthma  - well controlled  - symbicort PRN    # Diet: Clear liquids  # Activity: IAT  # GI PPX: PPI  # DVT PPX: Not indicated  # Full Code   47 yo M with PMH of Asthma, diverticulosis with frequent episodes of diverticulitis, presenting to the ED with c/o Persistent abdominal pain despite completion of outpatient antibiotic regimen.     # Acute sigmoid diverticulitis  - No sepsis on presentation  - LLQ pain for 2-3 weeks; worsening for 1 day  - CT abdomen and pelvis - acute sigmoid diverticulitis  - No abscess or perforation  - Clear liquid diet and IV hydration.  - Monitor BMP, LFT  - Supportive care : Pain control (toradol, tylenol) and antiemetics PRN  - Send blood cultures if febrile or hemodynamic instability  - Start on IV metronidazole and ciprofloxacin  - Advance diet as tolerated after resolution of pain  - Once tolerating diet, can switch to PO antibiotics  - If patient fails to improve within 48 to 72 hrs or shows any signs of worsening get CT abdomen and pelvis with contrast to rule out abscess or perforation.  - OP GI follow up after resolution of acute episode    # Asthma  - well controlled  - Duonebs PRN    # Diet: Clear liquids  # Activity: IAT  # GI PPX: PPI  # DVT PPX: Not indicated  # Full Code   47 yo M with PMH of Asthma, diverticulosis with frequent episodes of diverticulitis, presenting to the ED with c/o Persistent abdominal pain despite completion of outpatient antibiotic regimen.     # Acute sigmoid diverticulitis  - No sepsis on presentation  - LLQ pain for 2-3 weeks; worsening for 1 day  - CT abdomen and pelvis - acute sigmoid diverticulitis  - No abscess or perforation  - Clear liquid diet and IV hydration.  - Monitor BMP, LFT  - Supportive care : Pain control (toradol, tylenol) and antiemetics PRN  - Send blood cultures if febrile or hemodynamic instability  - Start on IV metronidazole and ciprofloxacin  - Advance diet as tolerated after resolution of pain  - Once tolerating diet, can switch to PO antibiotics  - If patient fails to improve within 48 to 72 hrs or shows any signs of worsening get CT abdomen and pelvis with contrast to rule out abscess or perforation.  - OP GI follow up after resolution of acute episode    # Asthma  - well controlled  - Duonebs PRN    # Diet: Clear liquids  # Activity: Ambulate  # GI PPX: PPI  # DVT PPX: Not indicated  # Full Code

## 2022-02-03 NOTE — H&P ADULT - HISTORY OF PRESENT ILLNESS
47 yo M with PMHX of asthma, diverticulosis with frequent episodes of diverticulitis, presented to the ED with c/o severe abdominal pain for 2-3 weeks - worsening for 1 day. Patient states that he has been having dull constant Left lower quadrant pain for the past 2-3 weeks, he was treated outpatient with one week course of ciprofloxacin and flagyl by his gastroenterologist; he completed the course of antibiotics about 1 week ago but was having persistent dull LLQ, non -radiating pain 5/p10 in severity and daily 2-3 loose bowel movements without blood, today morning he woke up with severe, sharp LLQ abdominal pain. Denies fever, chills, nausea, vomiting, diarrhea, chest pain, sob, dysuria, hematuria, penile discharge.  In the ED he was hemodynamically stable. Labs were unremarkable. CT abdomen and pelvis with IV contrast showed - acute sigmoid diverticulitis with no signs of perforation or abscess formation.   Patient being admitted for inpatient management of acute sigmoid diverticulitis.  47 yo M with PMHX of asthma, diverticulosis with frequent episodes of diverticulitis, presented to the ED with c/o severe abdominal pain for 2-3 weeks - worsening for 1 day. Patient states that he has been having dull constant Left lower quadrant pain for the past 2-3 weeks, he was treated outpatient with one week course of ciprofloxacin and flagyl by his gastroenterologist; he completed the course of antibiotics about 1 week ago but was having persistent dull LLQ, non -radiating pain 5/10 in severity and daily 2-3 loose bowel movements without blood, today morning he woke up with severe, sharp LLQ abdominal pain. Denies fever, chills, nausea, vomiting, diarrhea, chest pain, sob, dysuria, hematuria, penile discharge.  In the ED he was hemodynamically stable. Labs were unremarkable. CT abdomen and pelvis with IV contrast showed - acute sigmoid diverticulitis with no signs of perforation or abscess formation.   Patient being admitted for inpatient management of acute sigmoid diverticulitis.

## 2022-02-03 NOTE — H&P ADULT - NSHPLABSRESULTS_GEN_ALL_CORE
(02-03 @ 10:31)                      16.5  11.68 )-----------( 267                 48.5    Neutrophils = 9.29 (79.5%)  Lymphocytes = 1.62 (13.9%)  Eosinophils = 0.15 (1.3%)  Basophils = 0.07 (0.6%)  Monocytes = 0.51 (4.4%)  Bands = --%    02-03    140  |  102  |  11  ----------------------------<  96  4.7   |  26  |  1.0    Ca    9.6      03 Feb 2022 10:31    TPro  7.5  /  Alb  5.0  /  TBili  0.5  /  DBili  x   /  AST  14  /  ALT  15  /  AlkPhos  66  02-03            Urinalysis Basic - ( 03 Feb 2022 12:05 )    Color: Light Yellow / Appearance: Clear / SG: >1.050 / pH: x  Gluc: x / Ketone: Negative  / Bili: Negative / Urobili: <2 mg/dL   Blood: x / Protein: 30 mg/dL / Nitrite: Negative   Leuk Esterase: Negative / RBC: 1 /HPF / WBC 0 /HPF   Sq Epi: x / Non Sq Epi: 0 /HPF / Bacteria: Negative    < from: CT Abdomen and Pelvis w/ IV Cont (02.03.22 @ 11:18) >      PERITONEUM/MESENTERY/BOWEL: Colonic diverticulosis. Segmental mural   thickening of the sigmoid colon with surrounding inflammatory changes,   compatible with acute diverticulitis. No free air or fluid collection. No   evidence of bowel obstruction. Unremarkable appendix.    BONES/SOFT TISSUES: Unremarkable    IMPRESSION:    Acute sigmoid diverticulitis. No free air or fluid collection.    < end of copied text >

## 2022-02-03 NOTE — PATIENT PROFILE ADULT - FALL HARM RISK - UNIVERSAL INTERVENTIONS
Bed in lowest position, wheels locked, appropriate side rails in place/Call bell, personal items and telephone in reach/Instruct patient to call for assistance before getting out of bed or chair/Non-slip footwear when patient is out of bed/[place] to call system/Physically safe environment - no spills, clutter or unnecessary equipment/Purposeful Proactive Rounding/Room/bathroom lighting operational, light cord in reach

## 2022-02-03 NOTE — ED ADULT TRIAGE NOTE - CHIEF COMPLAINT QUOTE
"I have been suffering from Diverticulitis for many many years. I woke up this morning w/ a belly pain so bad."

## 2022-02-03 NOTE — ED PROVIDER NOTE - ATTENDING CONTRIBUTION TO CARE
47 y/o male h/o asthma, diverticulitis, denies PSH p/w left sided abd pain x 1 week, persistent, denies radiation, started on PO cipro / flagyl by GI (Dr Bassett) at onset with minimal relief, + dysuria and hesitancy, passing flatus, denies fever, n/v/d, anorexia, cough, respiratory sx, change in bowel habits, penile d/c or other associated complaints at present.     Old chart reviewed.  I have reviewed and agree with the initial nursing note, except as documented in my note.    VSS, awake, alert, non-toxic appearing, oropharynx clear, mmm, no jaundice, skin rash or lesions, chest CTAB, non-labored breathing, no w/r/r, +S1/S2, RRR, no m/r/g, abdomen soft, LLQ ttp w/o peritoneal signs, ND, +BS, no hernias or distention, no pulsatile masses or bruits appreciated, no CVA tenderness,  the pt was examined with a chaperone (KASSANDRA campos); uncircumcised, external genitalia normal, testes descended bilaterally, no lesions or discoloration, no hernias noted, inguinal nodes are neither enlarged nor painful, no abnormalities noted in the urethra, epididymis appears normal bilaterally, no urethral discharge, no peripheral edema or deformities, alert, clear speech and steady gait. 45 y/o male h/o asthma, diverticulitis, denies PSH p/w left sided abd pain x 1 week, persistent, denies radiation, started on PO cipro / flagyl and diet modification by GI (Dr Bassett) at onset with minimal relief, + dysuria and hesitancy, passing flatus, denies fever, n/v/d, anorexia, cough, respiratory sx, change in bowel habits, penile d/c or other associated complaints at present.     Old chart reviewed.  I have reviewed and agree with the initial nursing note, except as documented in my note.    VSS, awake, alert, non-toxic appearing, oropharynx clear, mmm, no jaundice, skin rash or lesions, chest CTAB, non-labored breathing, no w/r/r, +S1/S2, RRR, no m/r/g, abdomen soft, LLQ ttp w/o peritoneal signs, ND, +BS, no hernias or distention, no pulsatile masses or bruits appreciated, no CVA tenderness,  the pt was examined with a chaperone (KASSANDRA campos); uncircumcised, external genitalia normal, testes descended bilaterally, no lesions or discoloration, no hernias noted, inguinal nodes are neither enlarged nor painful, no abnormalities noted in the urethra, epididymis appears normal bilaterally, no urethral discharge, no peripheral edema or deformities, alert, clear speech and steady gait.

## 2022-02-03 NOTE — ED PROVIDER NOTE - NS ED MD EM SELECTION
Billing Type: [employer] Anesthesia Type: 1% Xylocaine without epinephrine Additional Anesthesia Volume In Cc (Will Not Render If 0): 0 Detail Level: Detailed Punch Size In Mm: 4 Suture Removal: 10 days Wound Care: Bacitracin Post-Care Instructions: I reviewed with the patient in detail post-care instructions. Patient is to keep the biopsy site dry overnight, and then apply bacitracin twice daily until healed. Patient may apply hydrogen peroxide soaks to remove any crusting. Anesthesia Volume In Cc (Will Not Render If 0): 0.5 Bill For Surgical Tray: no Epidermal Sutures: 4-0 Nylon Home Suture Removal Text: Patient was provided a home suture removal kit and will remove their sutures at home. If they have any questions or difficulties they will call the office. Was A Bandage Applied: Yes Hemostasis: None Lab: [id number] [facility] [address] Consent: Written consent was obtained and risks were reviewed including but not limited to scarring, infection, bleeding, scabbing, incomplete removal, nerve damage and allergy to anesthesia. Biopsy Type: H and E Notification Instructions: Patient will be notified of biopsy results. However, patient instructed to call the office if not contacted within 2 weeks. Dressing: bandage Number Of Epidermal Sutures (Optional): [phone] Comprehensive

## 2022-02-04 LAB
ALBUMIN SERPL ELPH-MCNC: 4.3 G/DL — SIGNIFICANT CHANGE UP (ref 3.5–5.2)
ALP SERPL-CCNC: 61 U/L — SIGNIFICANT CHANGE UP (ref 30–115)
ALT FLD-CCNC: 13 U/L — SIGNIFICANT CHANGE UP (ref 0–41)
ANION GAP SERPL CALC-SCNC: 20 MMOL/L — HIGH (ref 7–14)
AST SERPL-CCNC: 12 U/L — SIGNIFICANT CHANGE UP (ref 0–41)
BASOPHILS # BLD AUTO: 0.05 K/UL — SIGNIFICANT CHANGE UP (ref 0–0.2)
BASOPHILS NFR BLD AUTO: 0.5 % — SIGNIFICANT CHANGE UP (ref 0–1)
BILIRUB SERPL-MCNC: 0.9 MG/DL — SIGNIFICANT CHANGE UP (ref 0.2–1.2)
BUN SERPL-MCNC: 9 MG/DL — LOW (ref 10–20)
CALCIUM SERPL-MCNC: 9.2 MG/DL — SIGNIFICANT CHANGE UP (ref 8.5–10.1)
CHLORIDE SERPL-SCNC: 105 MMOL/L — SIGNIFICANT CHANGE UP (ref 98–110)
CO2 SERPL-SCNC: 18 MMOL/L — SIGNIFICANT CHANGE UP (ref 17–32)
CREAT SERPL-MCNC: 1 MG/DL — SIGNIFICANT CHANGE UP (ref 0.7–1.5)
CULTURE RESULTS: NO GROWTH — SIGNIFICANT CHANGE UP
EOSINOPHIL # BLD AUTO: 0.09 K/UL — SIGNIFICANT CHANGE UP (ref 0–0.7)
EOSINOPHIL NFR BLD AUTO: 1 % — SIGNIFICANT CHANGE UP (ref 0–8)
GLUCOSE SERPL-MCNC: 82 MG/DL — SIGNIFICANT CHANGE UP (ref 70–99)
HCT VFR BLD CALC: 43.1 % — SIGNIFICANT CHANGE UP (ref 42–52)
HGB BLD-MCNC: 14.8 G/DL — SIGNIFICANT CHANGE UP (ref 14–18)
IMM GRANULOCYTES NFR BLD AUTO: 0.2 % — SIGNIFICANT CHANGE UP (ref 0.1–0.3)
LACTATE SERPL-SCNC: 0.7 MMOL/L — SIGNIFICANT CHANGE UP (ref 0.7–2)
LYMPHOCYTES # BLD AUTO: 1.61 K/UL — SIGNIFICANT CHANGE UP (ref 1.2–3.4)
LYMPHOCYTES # BLD AUTO: 17.7 % — LOW (ref 20.5–51.1)
MAGNESIUM SERPL-MCNC: 1.8 MG/DL — SIGNIFICANT CHANGE UP (ref 1.8–2.4)
MCHC RBC-ENTMCNC: 30.1 PG — SIGNIFICANT CHANGE UP (ref 27–31)
MCHC RBC-ENTMCNC: 34.3 G/DL — SIGNIFICANT CHANGE UP (ref 32–37)
MCV RBC AUTO: 87.6 FL — SIGNIFICANT CHANGE UP (ref 80–94)
MONOCYTES # BLD AUTO: 0.57 K/UL — SIGNIFICANT CHANGE UP (ref 0.1–0.6)
MONOCYTES NFR BLD AUTO: 6.3 % — SIGNIFICANT CHANGE UP (ref 1.7–9.3)
NEUTROPHILS # BLD AUTO: 6.76 K/UL — HIGH (ref 1.4–6.5)
NEUTROPHILS NFR BLD AUTO: 74.3 % — SIGNIFICANT CHANGE UP (ref 42.2–75.2)
NRBC # BLD: 0 /100 WBCS — SIGNIFICANT CHANGE UP (ref 0–0)
PLATELET # BLD AUTO: 245 K/UL — SIGNIFICANT CHANGE UP (ref 130–400)
POTASSIUM SERPL-MCNC: 4.3 MMOL/L — SIGNIFICANT CHANGE UP (ref 3.5–5)
POTASSIUM SERPL-SCNC: 4.3 MMOL/L — SIGNIFICANT CHANGE UP (ref 3.5–5)
PROT SERPL-MCNC: 6.3 G/DL — SIGNIFICANT CHANGE UP (ref 6–8)
RBC # BLD: 4.92 M/UL — SIGNIFICANT CHANGE UP (ref 4.7–6.1)
RBC # FLD: 12.5 % — SIGNIFICANT CHANGE UP (ref 11.5–14.5)
SODIUM SERPL-SCNC: 143 MMOL/L — SIGNIFICANT CHANGE UP (ref 135–146)
SPECIMEN SOURCE: SIGNIFICANT CHANGE UP
WBC # BLD: 9.1 K/UL — SIGNIFICANT CHANGE UP (ref 4.8–10.8)
WBC # FLD AUTO: 9.1 K/UL — SIGNIFICANT CHANGE UP (ref 4.8–10.8)

## 2022-02-04 PROCEDURE — 99221 1ST HOSP IP/OBS SF/LOW 40: CPT

## 2022-02-04 PROCEDURE — 99233 SBSQ HOSP IP/OBS HIGH 50: CPT

## 2022-02-04 RX ORDER — POLYETHYLENE GLYCOL 3350 17 G/17G
17 POWDER, FOR SOLUTION ORAL DAILY
Refills: 0 | Status: DISCONTINUED | OUTPATIENT
Start: 2022-02-04 | End: 2022-02-07

## 2022-02-04 RX ORDER — CEFEPIME 1 G/1
2000 INJECTION, POWDER, FOR SOLUTION INTRAMUSCULAR; INTRAVENOUS EVERY 12 HOURS
Refills: 0 | Status: DISCONTINUED | OUTPATIENT
Start: 2022-02-05 | End: 2022-02-06

## 2022-02-04 RX ORDER — OXYCODONE AND ACETAMINOPHEN 5; 325 MG/1; MG/1
1 TABLET ORAL EVERY 6 HOURS
Refills: 0 | Status: DISCONTINUED | OUTPATIENT
Start: 2022-02-04 | End: 2022-02-04

## 2022-02-04 RX ORDER — CEFEPIME 1 G/1
2000 INJECTION, POWDER, FOR SOLUTION INTRAMUSCULAR; INTRAVENOUS ONCE
Refills: 0 | Status: COMPLETED | OUTPATIENT
Start: 2022-02-04 | End: 2022-02-04

## 2022-02-04 RX ORDER — CEFEPIME 1 G/1
INJECTION, POWDER, FOR SOLUTION INTRAMUSCULAR; INTRAVENOUS
Refills: 0 | Status: DISCONTINUED | OUTPATIENT
Start: 2022-02-04 | End: 2022-02-06

## 2022-02-04 RX ADMIN — Medication 100 MILLIGRAM(S): at 21:13

## 2022-02-04 RX ADMIN — Medication 100 MILLIGRAM(S): at 15:32

## 2022-02-04 RX ADMIN — CEFEPIME 100 MILLIGRAM(S): 1 INJECTION, POWDER, FOR SOLUTION INTRAMUSCULAR; INTRAVENOUS at 17:16

## 2022-02-04 RX ADMIN — Medication 200 MILLIGRAM(S): at 06:36

## 2022-02-04 RX ADMIN — Medication 100 MILLIGRAM(S): at 06:23

## 2022-02-04 RX ADMIN — Medication 20 MILLIGRAM(S): at 23:08

## 2022-02-04 RX ADMIN — POLYETHYLENE GLYCOL 3350 17 GRAM(S): 17 POWDER, FOR SOLUTION ORAL at 12:16

## 2022-02-04 RX ADMIN — SODIUM CHLORIDE 100 MILLILITER(S): 9 INJECTION, SOLUTION INTRAVENOUS at 06:23

## 2022-02-04 RX ADMIN — Medication 30 MILLIGRAM(S): at 05:59

## 2022-02-04 NOTE — CONSULT NOTE ADULT - ASSESSMENT
ASSESSMENT:  45 yo M with PMHX of asthma, diverticulosis with frequent episodes of diverticulitis, presented to the ED with c/o severe abdominal pain for 2-3 weeks - worsening for 1 day.  Recurrent, uncomplicated diverticulitis    PLAN:  - CLD  - IV abx  - Recommend colonoscopy after resolution of acute process  - May follow up with Dr. Christensen outpatient for discussion of elective sigmoidectomy  - Recall prn    Above plan discussed with Attending Surgeon Dr. Christensen , patient, patient family, and Primary team  02-04-22 @ 00:47   ASSESSMENT:  47 yo M with PMHX of asthma, diverticulosis with frequent episodes of diverticulitis, presented to the ED with c/o severe abdominal pain for 2-3 weeks - worsening for 1 day.  Recurrent, uncomplicated diverticulitis    PLAN:  - CLD  - IV abx  - Recommend colonoscopy after resolution of acute process, in 6-8 weeks  - Follow up with Dr. Christensen outpatient for discussion of elective sigmoidectomy  - Recall prn    Above plan discussed with Attending Surgeon Dr. Christensen , patient, patient family, and Primary team  02-04-22 @ 00:47   ASSESSMENT:  45 yo M with PMHX of asthma, diverticulosis with frequent episodes of diverticulitis, presented to the ED with c/o severe abdominal pain for 2-3 weeks - worsening for 1 day.  Recurrent, uncomplicated diverticulitis    PLAN:  - CLD  - IV abx  - Recommend colonoscopy after resolution of acute process, in 6-8 weeks  - No acute surgical intervention indicated at this moment due to current diverticulitis  - Follow up with Dr. Christensen outpatient for discussion of elective sigmoidectomy  - Recall prn    Above plan discussed with Attending Surgeon Dr. Christensen , patient, patient family, and Primary team  02-04-22 @ 00:47

## 2022-02-04 NOTE — CONSULT NOTE ADULT - SUBJECTIVE AND OBJECTIVE BOX
ELENI BLANKENSHIP  46y, Male  Allergy: No Known Allergies      All historical available data reviewed.    HPI:  45 yo M with PMHX of asthma, diverticulosis with frequent episodes of diverticulitis, presented to the ED with c/o severe abdominal pain for 2-3 weeks - worsening for 1 day. Patient states that he has been having dull constant Left lower quadrant pain for the past 2-3 weeks, he was treated outpatient with one week course of ciprofloxacin and flagyl by his gastroenterologist; he completed the course of antibiotics about 1 week ago but was having persistent dull LLQ, non -radiating pain 5/10 in severity and daily 2-3 loose bowel movements without blood, today morning he woke up with severe, sharp LLQ abdominal pain. Denies fever, chills, nausea, vomiting, diarrhea, chest pain, sob, dysuria, hematuria, penile discharge.  In the ED he was hemodynamically stable. Labs were unremarkable. CT abdomen and pelvis with IV contrast showed - acute sigmoid diverticulitis with no signs of perforation or abscess formation.   Patient being admitted for inpatient management of acute sigmoid diverticulitis.  (03 Feb 2022 13:58)    FAMILY HISTORY:  No pertinent family history in first degree relatives      PAST MEDICAL & SURGICAL HISTORY:  Diverticulitis    Asthma    No significant past surgical history          VITALS:  T(F): 97.1, Max: 98.9 (02-03-22 @ 15:49)  HR: 75  BP: 123/75  RR: 18Vital Signs Last 24 Hrs  T(C): 36.2 (04 Feb 2022 07:48), Max: 37.2 (03 Feb 2022 15:49)  T(F): 97.1 (04 Feb 2022 07:48), Max: 98.9 (03 Feb 2022 15:49)  HR: 75 (04 Feb 2022 07:48) (75 - 84)  BP: 123/75 (04 Feb 2022 07:48) (119/55 - 129/61)  BP(mean): --  RR: 18 (04 Feb 2022 07:48) (18 - 18)  SpO2: 100% (04 Feb 2022 07:48) (98% - 100%)    TESTS & MEASUREMENTS:                        14.8   9.10  )-----------( 245      ( 04 Feb 2022 04:30 )             43.1     02-04    143  |  105  |  9<L>  ----------------------------<  82  4.3   |  18  |  1.0    Ca    9.2      04 Feb 2022 04:30  Mg     1.8     02-04    TPro  6.3  /  Alb  4.3  /  TBili  0.9  /  DBili  x   /  AST  12  /  ALT  13  /  AlkPhos  61  02-04    LIVER FUNCTIONS - ( 04 Feb 2022 04:30 )  Alb: 4.3 g/dL / Pro: 6.3 g/dL / ALK PHOS: 61 U/L / ALT: 13 U/L / AST: 12 U/L / GGT: x             Urinalysis Basic - ( 03 Feb 2022 12:05 )    Color: Light Yellow / Appearance: Clear / SG: >1.050 / pH: x  Gluc: x / Ketone: Negative  / Bili: Negative / Urobili: <2 mg/dL   Blood: x / Protein: 30 mg/dL / Nitrite: Negative   Leuk Esterase: Negative / RBC: 1 /HPF / WBC 0 /HPF   Sq Epi: x / Non Sq Epi: 0 /HPF / Bacteria: Negative          RADIOLOGY & ADDITIONAL TESTS:  Personal review of radiological diagnostics performed  Echo and EKG results noted when applicable.     MEDICATIONS:  acetaminophen     Tablet .. 650 milliGRAM(s) Oral every 6 hours PRN  ALBUTerol    90 MICROgram(s) HFA Inhaler 2 Puff(s) Inhalation every 6 hours PRN  albuterol/ipratropium for Nebulization 3 milliLiter(s) Nebulizer every 6 hours PRN  ciprofloxacin   IVPB 400 milliGRAM(s) IV Intermittent every 12 hours  ketorolac   Injectable 30 milliGRAM(s) IV Push every 8 hours PRN  lactated ringers. 1000 milliLiter(s) IV Continuous <Continuous>  melatonin 3 milliGRAM(s) Oral at bedtime PRN  metroNIDAZOLE  IVPB 500 milliGRAM(s) IV Intermittent every 8 hours  ondansetron Injectable 4 milliGRAM(s) IV Push every 8 hours PRN  pantoprazole    Tablet 40 milliGRAM(s) Oral before breakfast  polyethylene glycol 3350 17 Gram(s) Oral daily      ANTIBIOTICS:  ciprofloxacin   IVPB 400 milliGRAM(s) IV Intermittent every 12 hours  metroNIDAZOLE  IVPB 500 milliGRAM(s) IV Intermittent every 8 hours

## 2022-02-04 NOTE — CONSULT NOTE ADULT - SUBJECTIVE AND OBJECTIVE BOX
GENERAL SURGERY CONSULT NOTE    Patient: ELENI BLANKENSHIP , 46y ([birth year])Male   MRN: [id number]  Location: [place] [address] A  Visit: 02-03-22 Inpatient  Date: 02-04-22 @ 00:47    HPI:  45 yo M with PMHX of asthma, diverticulosis with frequent episodes of diverticulitis, presented to the ED with c/o severe abdominal pain for 2-3 weeks - worsening for 1 day. Patient states that he has been having dull constant Left lower quadrant pain for the past 2-3 weeks, he was treated outpatient with one week course of ciprofloxacin and flagyl by his gastroenterologist; he completed the course of antibiotics about 1 week ago but was having persistent dull LLQ, non -radiating pain 5/10 in severity and daily 2-3 loose bowel movements without blood, today morning he woke up with severe, sharp LLQ abdominal pain. Denies fever, chills, nausea, vomiting, diarrhea, chest pain, sob, dysuria, hematuria, penile discharge.  In the ED he was hemodynamically stable. Labs were unremarkable. CT abdomen and pelvis with IV contrast showed - acute sigmoid diverticulitis with no signs of perforation or abscess formation.   Patient being admitted for inpatient management of acute sigmoid diverticulitis.  (03 Feb 2022 13:58)      PAST MEDICAL & SURGICAL HISTORY:  Diverticulitis    Asthma    No significant past surgical history        Home Medications:  Allegra 30 mg oral tablet: 1 tab(s) orally once a day (03 Feb 2022 17:04)        VITALS:  T(F): 97.7 (02-03-22 @ 23:51), Max: 99.1 (02-03-22 @ 09:56)  HR: 77 (02-03-22 @ 23:51) (77 - 84)  BP: 129/61 (02-03-22 @ 23:51) (119/55 - 129/61)  RR: 18 (02-03-22 @ 23:51) (18 - 19)  SpO2: 98% (02-03-22 @ 20:38) (98% - 98%)    PHYSICAL EXAM:  General: NAD, AAOx3, calm and cooperative  HEENT: NCAT, JIM, EOMI, Trachea ML, Neck supple  Cardiac: RRR S1, S2, no Murmurs, rubs or gallops  Respiratory: CTAB, normal respiratory effort, breath sounds equal BL, no wheeze, rhonchi or crackles  Abdomen: Soft, non-distended, LLQ tenderness, no rebound, no guarding. +BS.  Musculoskeletal: Strength 5/5 BL UE/LE, ROM intact, compartments soft      MEDICATIONS  (STANDING):  ciprofloxacin   IVPB 400 milliGRAM(s) IV Intermittent every 12 hours  lactated ringers. 1000 milliLiter(s) (100 mL/Hr) IV Continuous <Continuous>  metroNIDAZOLE  IVPB 500 milliGRAM(s) IV Intermittent every 8 hours  pantoprazole    Tablet 40 milliGRAM(s) Oral before breakfast    MEDICATIONS  (PRN):  acetaminophen     Tablet .. 650 milliGRAM(s) Oral every 6 hours PRN Temp greater or equal to 38C (100.4F), Mild Pain (1 - 3), Moderate Pain (4 - 6)  ALBUTerol    90 MICROgram(s) HFA Inhaler 2 Puff(s) Inhalation every 6 hours PRN Shortness of Breath and/or Wheezing  albuterol/ipratropium for Nebulization 3 milliLiter(s) Nebulizer every 6 hours PRN Shortness of Breath and/or Wheezing  ketorolac   Injectable 30 milliGRAM(s) IV Push every 8 hours PRN Severe Pain (7 - 10)  melatonin 3 milliGRAM(s) Oral at bedtime PRN Insomnia  ondansetron Injectable 4 milliGRAM(s) IV Push every 8 hours PRN Nausea and/or Vomiting      LAB/STUDIES:                        16.5   11.68 )-----------( 267      ( 03 Feb 2022 10:31 )             48.5     02-03    140  |  102  |  11  ----------------------------<  96  4.7   |  26  |  1.0    Ca    9.6      03 Feb 2022 10:31    TPro  7.5  /  Alb  5.0  /  TBili  0.5  /  DBili  x   /  AST  14  /  ALT  15  /  AlkPhos  66  02-03      LIVER FUNCTIONS - ( 03 Feb 2022 10:31 )  Alb: 5.0 g/dL / Pro: 7.5 g/dL / ALK PHOS: 66 U/L / ALT: 15 U/L / AST: 14 U/L / GGT: x           Urinalysis Basic - ( 03 Feb 2022 12:05 )    Color: Light Yellow / Appearance: Clear / SG: >1.050 / pH: x  Gluc: x / Ketone: Negative  / Bili: Negative / Urobili: <2 mg/dL   Blood: x / Protein: 30 mg/dL / Nitrite: Negative   Leuk Esterase: Negative / RBC: 1 /HPF / WBC 0 /HPF   Sq Epi: x / Non Sq Epi: 0 /HPF / Bacteria: Negative    IMAGING:  < from: CT Abdomen and Pelvis w/ IV Cont (02.03.22 @ 11:18) >  PERITONEUM/MESENTERY/BOWEL: Colonic diverticulosis. Segmental mural   thickening of the sigmoid colon with surrounding inflammatory changes,   compatible with acute diverticulitis. No free air or fluid collection. No   evidence of bowel obstruction. Unremarkable appendix.    BONES/SOFT TISSUES: Unremarkable    IMPRESSION:    Acute sigmoid diverticulitis. No free air or fluid collection.    --- End of Report ---        < end of copied text >      ACCESS DEVICES:  [ ] Peripheral IV  [ ] Central Venous Line	[ ] R	[ ] L	[ ] IJ	[ ] Fem	[ ] SC	Placed:   [ ] Arterial Line		[ ] R	[ ] L	[ ] Fem	[ ] Rad	[ ] Ax	Placed:   [ ] PICC:					[ ] Mediport  [ ] Urinary Catheter, Date Placed:

## 2022-02-04 NOTE — CONSULT NOTE ADULT - ASSESSMENT
45 yo M with PMHX of asthma, diverticulosis with frequent episodes of diverticulitis, presented to the ED with c/o severe abdominal pain for 2-3 weeks - worsening for 1 day. Patient states that he has been having dull constant Left lower quadrant pain for the past 2-3 weeks, he was treated outpatient with one week course of ciprofloxacin and flagyl by his gastroenterologist; he completed the course of antibiotics about 1 week ago but was having persistent dull LLQ, non -radiating pain 5/10 in severity and daily 2-3 loose bowel movements without blood, today morning he woke up with severe, sharp LLQ abdominal pain. Denies fever, chills..  2/3 CT abdomen and pelvis with IV contrast showed - acute sigmoid diverticulitis with no signs of perforation or abscess formation.     IMPRESSION;  Acute sigmoid diverticulitis with no signs of perforation or abscess formation.  45 yo M with PMHX of asthma, diverticulosis with frequent episodes of diverticulitis, presented to the ED with c/o severe abdominal pain for 2-3 weeks - worsening for 1 day. Patient states that he has been having dull constant Left lower quadrant pain for the past 2-3 weeks, he was treated outpatient with one week course of ciprofloxacin and flagyl by his gastroenterologist; he completed the course of antibiotics about 1 week ago but was having persistent dull LLQ, non -radiating pain 5/10 in severity and daily 2-3 loose bowel movements without blood, today morning he woke up with severe, sharp LLQ abdominal pain. Denies fever, chills..  2/3 CT abdomen and pelvis with IV contrast showed - acute sigmoid diverticulitis with no signs of perforation or abscess formation.     IMPRESSION;  Acute sigmoid diverticulitis with no signs of perforation or abscess formation.   No peritonitis on PE  WBC 9.1    RECOMMENDATIONS;  Cefepime 2 gm iv q12h  Flagyl 500 mg iv q8h  Midline  D/c in 24h on Ertapenem 1 gm iv q24h till 2/14  f/u with Dr Pinon [id number] at [address] on tuesday via telehealth . Pt will be called  between 10-12.30 am.  [address]  [phone]  Fax [phone]  Please do not hesitate to recall ID if any questions arise either through [employer] or through [employer]

## 2022-02-04 NOTE — PROGRESS NOTE ADULT - ASSESSMENT
45 yo M with PMHX of asthma, diverticulosis with frequent episodes of diverticulitis, presented to the ED with c/o  abdominal pain for 2-3 weeks - worsening for 1 day. Patient states that he has been having dull constant Left lower quadrant pain for the past 2-3 weeks, he was treated outpatient with one week course of ciprofloxacin and flagyl by his gastroenterologist; he completed the course of antibiotics about 1 week ago but was having persistent dull LLQ, non -radiating pain 5/10 in severity and daily 2-3 loose bowel movements without blood, today morning he woke up with severe, sharp LLQ abdominal pain.     # Recurrent Sigmoid diverticulitis  - no sepsis  - he was treated outpatient with one week course of ciprofloxacin and flagyl by his gastroenterologist  - CT Abdomen and Pelvis w/ IV Cont (02.03.22 @ 11:18) >Acute sigmoid diverticulitis. No free air or fluid collection.  - c/w pain meds  -Pt on  IV ciprofloxacin, flagyl--> ID eval ? meropenem  - full liquid diet , advance diet as tolerated   - surgery eval: : Recommend colonoscopy after resolution of acute process, in 6-8 weeks  - No acute surgical intervention indicated at this moment due to current diverticulitis  - Follow up with Dr. Christensen outpatient for discussion of elective sigmoidectomy    # Constipation  - start miralax    # Asthma- stable    # DVT prophylaxis-low risk for DVT, pt ambulating    # Full code    #Pending: clinical improvement, anticipate for discharge in AM  # Discussed plan of care with patient  # Disposition: Home

## 2022-02-04 NOTE — CONSULT NOTE ADULT - ATTENDING COMMENTS
Patient is a 46M with PMH of asthma, multiple episodes of uncomplicated diverticulitis, presented with recurrent uncomplicated sigmoid diverticulitis.      Patient seen and examined.  No acute events over night.  Patient is tolerating liquids and passing flatus    Abdomen - soft, non distended, mild LLQ tenderness    Vitals labs and images reviewed    CTAP Acute sigmoid diverticulitis. No free air or fluid collection.    Plan  - CLD  - advance diet as tolerated  - continue antibiotics  - patient will require outpatient colonoscopy 4-6 weeks after discharge  - will plan for elective sigmoidectomy

## 2022-02-04 NOTE — PROGRESS NOTE ADULT - ASSESSMENT
47 yo M with PMH of Asthma, diverticulosis with frequent episodes of diverticulitis, presenting to the ED with c/o Persistent abdominal pain despite completion of outpatient antibiotic regimen.     # Acute sigmoid diverticulitis  > CT abdomen and pelvis: acute sigmoid diverticulitis. No abscess or perforation  > Completed course of Cipro/Flagyl OP prior to presentation  > OP GI: Dr. Bassett  - Send blood cultures if febrile or hemodynamic instability  - Advanced to Full Liquids 2/4. Cont to advance diet as tolerated  - Once tolerating diet, can switch to PO antibiotics  - c/w Tylenol 650 q6h PRN mild pain, Toradol 30 q8h PRN severe pain - patient tolerating diet, opioids not indicated  - start Bentyl 20 QAC for colonic spasm pain  - c/w Zofran PRN nausea  - start Miralax for constipation  - If patient fails to improve within 48 to 72 hrs or shows any signs of worsening get CT abdomen and pelvis with contrast to rule out abscess or perforation.  - OP GI follow up after resolution of acute episode  - f/u Sx Dr. Christensen 4-6 weeks post-discharge for colonoscopy; plan for elective sigmoidectomy    # Asthma  > well controlled  - Duonebs PRN    #Pending: improvement in pain  #Disposition: From home; dispo on PO ABX when tolerating PO diet  #Diet: Full liquid - advance as tolerated  #GI Prophylaxis: Protonix 40mg PO Daily  #DVT Prophylaxis: Not indicated  #Activity: Ambulate as tolerated  #Code Status: Full Code

## 2022-02-05 ENCOUNTER — TRANSCRIPTION ENCOUNTER (OUTPATIENT)
Age: 47
End: 2022-02-05

## 2022-02-05 LAB
ALBUMIN SERPL ELPH-MCNC: 4.7 G/DL — SIGNIFICANT CHANGE UP (ref 3.5–5.2)
ALP SERPL-CCNC: 63 U/L — SIGNIFICANT CHANGE UP (ref 30–115)
ALT FLD-CCNC: 16 U/L — SIGNIFICANT CHANGE UP (ref 0–41)
ANION GAP SERPL CALC-SCNC: 10 MMOL/L — SIGNIFICANT CHANGE UP (ref 7–14)
AST SERPL-CCNC: 16 U/L — SIGNIFICANT CHANGE UP (ref 0–41)
BASOPHILS # BLD AUTO: 0.07 K/UL — SIGNIFICANT CHANGE UP (ref 0–0.2)
BASOPHILS NFR BLD AUTO: 1 % — SIGNIFICANT CHANGE UP (ref 0–1)
BILIRUB SERPL-MCNC: 0.4 MG/DL — SIGNIFICANT CHANGE UP (ref 0.2–1.2)
BUN SERPL-MCNC: 11 MG/DL — SIGNIFICANT CHANGE UP (ref 10–20)
CALCIUM SERPL-MCNC: 9.7 MG/DL — SIGNIFICANT CHANGE UP (ref 8.5–10.1)
CHLORIDE SERPL-SCNC: 105 MMOL/L — SIGNIFICANT CHANGE UP (ref 98–110)
CO2 SERPL-SCNC: 27 MMOL/L — SIGNIFICANT CHANGE UP (ref 17–32)
CREAT SERPL-MCNC: 1.1 MG/DL — SIGNIFICANT CHANGE UP (ref 0.7–1.5)
EOSINOPHIL # BLD AUTO: 0.08 K/UL — SIGNIFICANT CHANGE UP (ref 0–0.7)
EOSINOPHIL NFR BLD AUTO: 1.1 % — SIGNIFICANT CHANGE UP (ref 0–8)
GLUCOSE SERPL-MCNC: 109 MG/DL — HIGH (ref 70–99)
HCT VFR BLD CALC: 47.3 % — SIGNIFICANT CHANGE UP (ref 42–52)
HGB BLD-MCNC: 16.2 G/DL — SIGNIFICANT CHANGE UP (ref 14–18)
IMM GRANULOCYTES NFR BLD AUTO: 0.3 % — SIGNIFICANT CHANGE UP (ref 0.1–0.3)
LYMPHOCYTES # BLD AUTO: 1.39 K/UL — SIGNIFICANT CHANGE UP (ref 1.2–3.4)
LYMPHOCYTES # BLD AUTO: 19.9 % — LOW (ref 20.5–51.1)
MAGNESIUM SERPL-MCNC: 2.1 MG/DL — SIGNIFICANT CHANGE UP (ref 1.8–2.4)
MCHC RBC-ENTMCNC: 30.3 PG — SIGNIFICANT CHANGE UP (ref 27–31)
MCHC RBC-ENTMCNC: 34.2 G/DL — SIGNIFICANT CHANGE UP (ref 32–37)
MCV RBC AUTO: 88.4 FL — SIGNIFICANT CHANGE UP (ref 80–94)
MONOCYTES # BLD AUTO: 0.5 K/UL — SIGNIFICANT CHANGE UP (ref 0.1–0.6)
MONOCYTES NFR BLD AUTO: 7.1 % — SIGNIFICANT CHANGE UP (ref 1.7–9.3)
NEUTROPHILS # BLD AUTO: 4.94 K/UL — SIGNIFICANT CHANGE UP (ref 1.4–6.5)
NEUTROPHILS NFR BLD AUTO: 70.6 % — SIGNIFICANT CHANGE UP (ref 42.2–75.2)
NRBC # BLD: 0 /100 WBCS — SIGNIFICANT CHANGE UP (ref 0–0)
PLATELET # BLD AUTO: 271 K/UL — SIGNIFICANT CHANGE UP (ref 130–400)
POTASSIUM SERPL-MCNC: 5.3 MMOL/L — HIGH (ref 3.5–5)
POTASSIUM SERPL-SCNC: 5.3 MMOL/L — HIGH (ref 3.5–5)
PROT SERPL-MCNC: 7.4 G/DL — SIGNIFICANT CHANGE UP (ref 6–8)
RBC # BLD: 5.35 M/UL — SIGNIFICANT CHANGE UP (ref 4.7–6.1)
RBC # FLD: 12.4 % — SIGNIFICANT CHANGE UP (ref 11.5–14.5)
SODIUM SERPL-SCNC: 142 MMOL/L — SIGNIFICANT CHANGE UP (ref 135–146)
WBC # BLD: 7 K/UL — SIGNIFICANT CHANGE UP (ref 4.8–10.8)
WBC # FLD AUTO: 7 K/UL — SIGNIFICANT CHANGE UP (ref 4.8–10.8)

## 2022-02-05 PROCEDURE — 99233 SBSQ HOSP IP/OBS HIGH 50: CPT | Mod: 25

## 2022-02-05 PROCEDURE — 76937 US GUIDE VASCULAR ACCESS: CPT | Mod: 26

## 2022-02-05 PROCEDURE — 99232 SBSQ HOSP IP/OBS MODERATE 35: CPT

## 2022-02-05 RX ADMIN — SODIUM CHLORIDE 100 MILLILITER(S): 9 INJECTION, SOLUTION INTRAVENOUS at 16:55

## 2022-02-05 RX ADMIN — SODIUM CHLORIDE 100 MILLILITER(S): 9 INJECTION, SOLUTION INTRAVENOUS at 13:16

## 2022-02-05 RX ADMIN — Medication 100 MILLIGRAM(S): at 13:15

## 2022-02-05 RX ADMIN — Medication 100 MILLIGRAM(S): at 05:20

## 2022-02-05 RX ADMIN — Medication 100 MILLIGRAM(S): at 23:04

## 2022-02-05 RX ADMIN — CEFEPIME 100 MILLIGRAM(S): 1 INJECTION, POWDER, FOR SOLUTION INTRAMUSCULAR; INTRAVENOUS at 16:55

## 2022-02-05 RX ADMIN — CEFEPIME 100 MILLIGRAM(S): 1 INJECTION, POWDER, FOR SOLUTION INTRAMUSCULAR; INTRAVENOUS at 05:20

## 2022-02-05 NOTE — PROGRESS NOTE ADULT - ASSESSMENT
47 yo M with PMHX of asthma, diverticulosis with frequent episodes of diverticulitis, presented to the ED with c/o  abdominal pain for 2-3 weeks - worsening for 1 day. Patient states that he has been having dull constant Left lower quadrant pain for the past 2-3 weeks, he was treated outpatient with one week course of ciprofloxacin and flagyl by his gastroenterologist; he completed the course of antibiotics about 1 week ago but was having persistent dull LLQ, non -radiating pain 5/10 in severity and daily 2-3 loose bowel movements without blood, today morning he woke up with severe, sharp LLQ abdominal pain.     # Recurrent Sigmoid diverticulitis  - no sepsis  - he was treated outpatient with one week course of ciprofloxacin and flagyl by his gastroenterologist  - CT Abdomen and Pelvis w/ IV Cont (02.03.22 @ 11:18) >Acute sigmoid diverticulitis. No free air or fluid collection.  - c/w pain meds  -c/w Cefepime 2 gm iv q12h, Flagyl 500 mg iv q8h  - Midline placed   - D/c in 24h on Ertapenem 1 gm iv q24h till 2/14  - advance diet as tolerated   - surgery eval: : Recommend colonoscopy after resolution of acute process, in 6-8 weeks  - No acute surgical intervention indicated at this moment due to current diverticulitis  - Follow up with Dr. Christensen outpatient for discussion of elective sigmoidectomy    # Constipation  - c/w  miralax    # Asthma- stable    # DVT prophylaxis-low risk for DVT, pt ambulating    # Full code    #Pending: awaiting home care set up for IV antibiotics  # Discussed plan of care with patient  # Disposition: Home

## 2022-02-05 NOTE — PROGRESS NOTE ADULT - ASSESSMENT
ASSESSMENT:  ASSESSMENT:  47 yo M with PMHX of asthma, diverticulosis with frequent episodes of diverticulitis, presented to the ED with c/o severe abdominal pain for 2-3 weeks - worsening for 1 day. Recurrent, uncomplicated diverticulitis    PLAN:  - CLD  - IV abx  - Recommend colonoscopy after resolution of acute process, in 6-8 weeks  - No acute surgical intervention indicated at this moment due to current diverticulitis  - Follow up with Dr. Christensen outpatient for discussion of elective sigmoidectomy      BLUE [employer] SPECTRA: 0800

## 2022-02-05 NOTE — DISCHARGE NOTE PROVIDER - NSDCMRMEDTOKEN_GEN_ALL_CORE_FT
Allegra 30 mg oral tablet: 1 tab(s) orally once a day  ertapenem: 1 gram(s) intravenously every 24 hours   Allegra 30 mg oral tablet: 1 tab(s) orally once a day  ertapenem: 1 gram(s) intravenously every 24 hours till 2/14/2022 included   acetaminophen 325 mg oral tablet: 2 tab(s) orally every 6 hours, As needed, Temp greater or equal to 38C (100.4F), Mild Pain (1 - 3), Moderate Pain (4 - 6)  Allegra 30 mg oral tablet: 1 tab(s) orally once a day  ertapenem: 1 gram(s) intravenously every 24 hours till 2/14/2022 included  polyethylene glycol 3350 oral powder for reconstitution: 17 gram(s) orally once a day, As Needed -for constipation

## 2022-02-05 NOTE — PROGRESS NOTE ADULT - ASSESSMENT
45 yo M with PMH of Asthma, diverticulosis with frequent episodes of diverticulitis, presenting to the ED with c/o Persistent abdominal pain despite completion of outpatient antibiotic regimen.     # Acute sigmoid diverticulitis  > CT abdomen and pelvis: acute sigmoid diverticulitis. No abscess or perforation  > Completed course of Cipro/Flagyl OP prior to presentation  > OP GI: Dr. Bassett  - advance diet as tolerated  - c/w Tylenol 650 q6h PRN mild pain, Toradol 30 q8h PRN severe pain - patient tolerating diet, opioids not indicated  - c/w Bentyl 20 QAC for colonic spasm pain  - c/w Zofran PRN nausea  - c/w Miralax for constipation  - OP GI follow up after resolution of acute episode  - f/u Sx Dr. Christensen 4-6 weeks post-discharge for colonoscopy; plan for elective sigmoidectomy  - d/c on home IV Ertapenem 1g q24h - sterile midline in place - pending ABX delivery    # Asthma  > well controlled  - Duonebs PRN    #Pending: IV ABX delivery to home  #Disposition: Home w/in 24 hours  #Diet: advance as tolerated  #GI Prophylaxis: Protonix 40mg PO Daily  #DVT Prophylaxis: Not indicated  #Activity: Ambulate as tolerated  #Code Status: Full Code

## 2022-02-05 NOTE — DISCHARGE NOTE NURSING/CASE MANAGEMENT/SOCIAL WORK - PATIENT PORTAL LINK FT
You can access the FollowMyHealth Patient Portal offered by [facility] by registering at the following website: [email or web address]. By joining [facility]’s FollowMyHealth portal, you will also be able to view your health information using other applications (apps) compatible with our system.

## 2022-02-05 NOTE — DISCHARGE NOTE PROVIDER - NSDCCPCAREPLAN_GEN_ALL_CORE_FT
PRINCIPAL DISCHARGE DIAGNOSIS  Diagnosis: Diverticulitis  Assessment and Plan of Treatment: You presented with abdominal pain and were found to have acute diverticulitis. You were treated with IV antibiotics with good effect. You were able to tolerate oral intake, so may continue your IV antibiotics from home. A sterile midline was placed prior to discharge and will be used for your IV antibiotics. Please follow up with Dr. Bassett in 4-6 weeks for colonoscopy. Please follow up with Dr. Christensen to discuss elective sigmoidectomy. Please follow up with your primary care provider within a week to monitor your progress post-discharge. Should you experience severe abdominal pain, large volume blood per rectum, or develop fever/chills/myalgias, please seek care at an emergency department or call 911.

## 2022-02-05 NOTE — DISCHARGE NOTE NURSING/CASE MANAGEMENT/SOCIAL WORK - NSDCPEFALRISK_GEN_ALL_CORE
For information on Fall & Injury Prevention, visit: [email or web address] OR  [email or web address] OR  https://www.cdc.gov/steadi/patient.html

## 2022-02-05 NOTE — DISCHARGE NOTE PROVIDER - CARE PROVIDER_API CALL
Elliott Christensen)  ColonRectal Surgery; Surgery  [address] [address] [facility], [address]rd [place] [address]  Phone: [phone]  Fax: [phone]  Established Patient  Follow Up Time: 2 weeks    Windy Bassett  Gastroenterology  [address] [facility] [facility] [address] [facility], [address]  [place] [id number]  Phone: [phone]  Fax: [phone]  Established Patient  Follow Up Time: 1 month    Geo Piedra)  [address] [place] [address]  Phone: [phone]  Fax: [phone]  Established Patient  Follow Up Time: 1-3 days   Elliott Christensen)  ColonRectal Surgery; Surgery  [address] [facility] [facility], [address]rd [place] [address]  Phone: [phone]  Fax: [phone]  Established Patient  Scheduled Appointment: 02/14/2022 09:00 AM    Windy Bassett  Gastroenterology  [address] [facility] [address] [facility], [address]  [place] [id number]  Phone: [phone]  Fax: [phone]  Established Patient  Follow Up Time: 1 month    Geo Piedra)  [address] [place] [address]  Phone: [phone]  Fax: [phone]  Established Patient  Follow Up Time: 1-3 days    Corina Pinon)  Infectious Disease; Internal Medicine  [address] [place] [address]  Phone: [phone]  Fax: [phone]  Scheduled Appointment: 02/08/2022 10:00 AM   Elliott Christensen)  ColonRectal Surgery; Surgery  [address] [facility] [facility], [address]rd [place] [address]  Phone: [phone]  Fax: [phone]  Established Patient  Scheduled Appointment: 02/14/2022 09:00 AM    Windy Bassett  Gastroenterology  [address] [facility], [address]  [place] [id number]  Phone: [phone]  Fax: [phone]  Established Patient  Follow Up Time: 1 month    Geo Piedra)  [address] [place] [address]  Phone: [phone]  Fax: [phone]  Established Patient  Follow Up Time: 1 week    Corina Pinon)  Infectious Disease; Internal Medicine  [address] [place] [address]  Phone: [phone]  Fax: [phone]  Scheduled Appointment: 02/08/2022 10:00 AM

## 2022-02-05 NOTE — DISCHARGE NOTE PROVIDER - ATTENDING DISCHARGE PHYSICAL EXAMINATION:
T(C): 37.1 (02-07-22 @ 08:00), Max: 37.1 (02-07-22 @ 08:00)  HR: 91 (02-07-22 @ 08:00) (73 - 91)  BP: 138/81 (02-07-22 @ 08:00) (125/80 - 138/81)  RR: 18 (02-07-22 @ 08:00) (18 - 18)  SpO2: 98% (02-07-22 @ 08:00) (98% - 98%)  O/E:  Awake, alert, not in distress.  HEENT: atraumatic, EOMI.  Chest: clear.  CVS: SIS2 +, no murmur.  P/A: Soft, BS+  CNS: awake, alert  Ext: no edema feet.  Skin: no rash, no ulcers.  All systems reviewed positive findings as above.

## 2022-02-05 NOTE — DISCHARGE NOTE PROVIDER - CARE PROVIDERS DIRECT ADDRESSES
[email or web address]

## 2022-02-05 NOTE — DISCHARGE NOTE PROVIDER - PROVIDER TOKENS
PROVIDER:[TOKEN:[[id number]],FOLLOWUP:[2 weeks],ESTABLISHEDPATIENT:[T]],PROVIDER:[TOKEN:[[id number]],FOLLOWUP:[1 month],ESTABLISHEDPATIENT:[T]],PROVIDER:[TOKEN:[[id number]],FOLLOWUP:[1-3 days],ESTABLISHEDPATIENT:[T]] PROVIDER:[TOKEN:[[id number]],SCHEDULEDAPPT:[02/14/2022],SCHEDULEDAPPTTIME:[09:00 AM],ESTABLISHEDPATIENT:[T]],PROVIDER:[TOKEN:[[id number]],FOLLOWUP:[1 month],ESTABLISHEDPATIENT:[T]],PROVIDER:[TOKEN:[[id number]],FOLLOWUP:[1-3 days],ESTABLISHEDPATIENT:[T]],PROVIDER:[TOKEN:[[id number]],SCHEDULEDAPPT:[02/08/2022],SCHEDULEDAPPTTIME:[10:00 AM]] PROVIDER:[TOKEN:[[id number]],SCHEDULEDAPPT:[02/14/2022],SCHEDULEDAPPTTIME:[09:00 AM],ESTABLISHEDPATIENT:[T]],PROVIDER:[TOKEN:[[id number]],FOLLOWUP:[1 month],ESTABLISHEDPATIENT:[T]],PROVIDER:[TOKEN:[[id number]],FOLLOWUP:[1 week],ESTABLISHEDPATIENT:[T]],PROVIDER:[TOKEN:[[id number]],SCHEDULEDAPPT:[02/08/2022],SCHEDULEDAPPTTIME:[10:00 AM]]

## 2022-02-05 NOTE — DISCHARGE NOTE PROVIDER - HOSPITAL COURSE
47 yo M with PMHX of asthma, diverticulosis with frequent episodes of diverticulitis, presented to the ED with c/o severe abdominal pain for 2-3 weeks - worsening for 1 day. Patient states that he has been having dull constant Left lower quadrant pain for the past 2-3 weeks, he was treated outpatient with one week course of ciprofloxacin and flagyl by his gastroenterologist; he completed the course of antibiotics about 1 week ago but was having persistent dull LLQ, non -radiating pain 5/10 in severity and daily 2-3 loose bowel movements without blood, today morning he woke up with severe, sharp LLQ abdominal pain. Denies fever, chills, nausea, vomiting, diarrhea, chest pain, sob, dysuria, hematuria, penile discharge.  In the ED he was hemodynamically stable. Labs were unremarkable. CT abdomen and pelvis with IV contrast showed - acute sigmoid diverticulitis with no signs of perforation or abscess formation.   Patient being admitted for inpatient management of acute sigmoid diverticulitis.     Antibiotics switched to IV Cefepime + Flagyl and Bentyl 20 added QAC with improvement in pain. Patient tolerating PO intake. Will f/u w/ GI OP in 4-6 weeks for colonoscopy. Patient to follow up with Dr. Christensen OP for elective sigmoidectomy. Per ID, patient to d/c on IV Ertapenem 1gm q24h until 2/14/2022. Sterile midline placed prior to discharge      # Acute sigmoid diverticulitis  > CT abdomen and pelvis: acute sigmoid diverticulitis. No abscess or perforation  > Completed course of Cipro/Flagyl OP prior to presentation  > OP GI: Dr. Bassett  - c/w Tylenol 650 q6h PRN mild pain, Toradol 30 q8h PRN severe pain - patient tolerating diet, opioids not indicated  - c/w Bentyl 20 QAC for colonic spasm pain  - start Miralax for constipation  - OP GI follow up after resolution of acute episode  - f/u Sx Dr. Christensen 4-6 weeks post-discharge for colonoscopy; plan for elective sigmoidectomy 45 yo M with PMHX of asthma, diverticulosis with frequent episodes of diverticulitis, presented to the ED with c/o severe abdominal pain for 2-3 weeks - worsening for 1 day. Patient states that he has been having dull constant Left lower quadrant pain for the past 2-3 weeks, he was treated outpatient with one week course of ciprofloxacin and flagyl by his gastroenterologist; he completed the course of antibiotics about 1 week ago but was having persistent dull LLQ, non -radiating pain 5/10 in severity and daily 2-3 loose bowel movements without blood, today morning he woke up with severe, sharp LLQ abdominal pain. Denies fever, chills, nausea, vomiting, diarrhea, chest pain, sob, dysuria, hematuria, penile discharge.  In the ED he was hemodynamically stable. Labs were unremarkable. CT abdomen and pelvis with IV contrast showed - acute sigmoid diverticulitis with no signs of perforation or abscess formation.   Patient being admitted for inpatient management of acute sigmoid diverticulitis.     Antibiotics switched to IV Cefepime + Flagyl and Bentyl 20 added QAC with improvement in pain. Patient tolerating PO intake. Will f/u w/ GI OP in 4-6 weeks for colonoscopy. Patient to follow up with Dr. Christensen OP for elective sigmoidectomy. Per ID, patient to d/c on IV Ertapenem 1gm q24h until 2/14/2022. Sterile midline placed prior to discharge      # Acute sigmoid diverticulitis  > CT abdomen and pelvis: acute sigmoid diverticulitis. No abscess or perforation  > Completed course of Cipro/Flagyl OP prior to presentation  > OP GI: Dr. Bassett  - c/w Tylenol 650 q6h PRN mild pain,  - patient tolerating diet  - Miralax  prn for constipation  - OP GI follow up after resolution of acute episode  - f/u Sx Dr. Christensen 4-6 weeks post-discharge for colonoscopy; plan for elective sigmoidectomy

## 2022-02-05 NOTE — DISCHARGE NOTE PROVIDER - INSTRUCTIONS
Advance your diet as tolerated - Full liquids -> low fiber and soft -> regular diet -> increased fiber  low fiber and soft

## 2022-02-05 NOTE — DISCHARGE NOTE NURSING/CASE MANAGEMENT/SOCIAL WORK - NSDCFUADDAPPT_GEN_ALL_CORE_FT
follow up with Dr Pinon [id number] at [address] on Tuesday via telehealth . Pt will be called  between 10-12.30 am.  [address]  [phone]  Fax [phone]

## 2022-02-05 NOTE — DISCHARGE NOTE PROVIDER - NSDCFUADDINST_GEN_ALL_CORE_FT
- IV  Ertapenem 1 gm iv q24h till 2/14  - colonoscopy after resolution of acute process, in 6-8 weeks . Follow up with Dr. Christensen (appointment was scheduled 9 AM 2/14/22) outpatient for discussion of elective sigmoidectomy

## 2022-02-05 NOTE — DISCHARGE NOTE PROVIDER - NSDCFUADDAPPT_GEN_ALL_CORE_FT
follow up with Dr Pinon [id number] at [address] on Tuesday via telehealth . Pt will be called  between 10-12.30 am.  [address]  [phone]  Fax [phone]-

## 2022-02-06 LAB
ALBUMIN SERPL ELPH-MCNC: 4.5 G/DL — SIGNIFICANT CHANGE UP (ref 3.5–5.2)
ALP SERPL-CCNC: 62 U/L — SIGNIFICANT CHANGE UP (ref 30–115)
ALT FLD-CCNC: 20 U/L — SIGNIFICANT CHANGE UP (ref 0–41)
ANION GAP SERPL CALC-SCNC: 9 MMOL/L — SIGNIFICANT CHANGE UP (ref 7–14)
AST SERPL-CCNC: 21 U/L — SIGNIFICANT CHANGE UP (ref 0–41)
BASOPHILS # BLD AUTO: 0.07 K/UL — SIGNIFICANT CHANGE UP (ref 0–0.2)
BASOPHILS NFR BLD AUTO: 0.9 % — SIGNIFICANT CHANGE UP (ref 0–1)
BILIRUB SERPL-MCNC: 0.8 MG/DL — SIGNIFICANT CHANGE UP (ref 0.2–1.2)
BUN SERPL-MCNC: 9 MG/DL — LOW (ref 10–20)
CALCIUM SERPL-MCNC: 9.6 MG/DL — SIGNIFICANT CHANGE UP (ref 8.5–10.1)
CHLORIDE SERPL-SCNC: 103 MMOL/L — SIGNIFICANT CHANGE UP (ref 98–110)
CO2 SERPL-SCNC: 25 MMOL/L — SIGNIFICANT CHANGE UP (ref 17–32)
CREAT SERPL-MCNC: 1 MG/DL — SIGNIFICANT CHANGE UP (ref 0.7–1.5)
EOSINOPHIL # BLD AUTO: 0.1 K/UL — SIGNIFICANT CHANGE UP (ref 0–0.7)
EOSINOPHIL NFR BLD AUTO: 1.3 % — SIGNIFICANT CHANGE UP (ref 0–8)
GLUCOSE SERPL-MCNC: 105 MG/DL — HIGH (ref 70–99)
HCT VFR BLD CALC: 46.5 % — SIGNIFICANT CHANGE UP (ref 42–52)
HGB BLD-MCNC: 15.9 G/DL — SIGNIFICANT CHANGE UP (ref 14–18)
IMM GRANULOCYTES NFR BLD AUTO: 0.3 % — SIGNIFICANT CHANGE UP (ref 0.1–0.3)
LYMPHOCYTES # BLD AUTO: 1.53 K/UL — SIGNIFICANT CHANGE UP (ref 1.2–3.4)
LYMPHOCYTES # BLD AUTO: 20.3 % — LOW (ref 20.5–51.1)
MAGNESIUM SERPL-MCNC: 2 MG/DL — SIGNIFICANT CHANGE UP (ref 1.8–2.4)
MCHC RBC-ENTMCNC: 30.1 PG — SIGNIFICANT CHANGE UP (ref 27–31)
MCHC RBC-ENTMCNC: 34.2 G/DL — SIGNIFICANT CHANGE UP (ref 32–37)
MCV RBC AUTO: 87.9 FL — SIGNIFICANT CHANGE UP (ref 80–94)
MONOCYTES # BLD AUTO: 0.54 K/UL — SIGNIFICANT CHANGE UP (ref 0.1–0.6)
MONOCYTES NFR BLD AUTO: 7.2 % — SIGNIFICANT CHANGE UP (ref 1.7–9.3)
NEUTROPHILS # BLD AUTO: 5.28 K/UL — SIGNIFICANT CHANGE UP (ref 1.4–6.5)
NEUTROPHILS NFR BLD AUTO: 70 % — SIGNIFICANT CHANGE UP (ref 42.2–75.2)
NRBC # BLD: 0 /100 WBCS — SIGNIFICANT CHANGE UP (ref 0–0)
PLATELET # BLD AUTO: 250 K/UL — SIGNIFICANT CHANGE UP (ref 130–400)
POTASSIUM SERPL-MCNC: 4.8 MMOL/L — SIGNIFICANT CHANGE UP (ref 3.5–5)
POTASSIUM SERPL-SCNC: 4.8 MMOL/L — SIGNIFICANT CHANGE UP (ref 3.5–5)
PROT SERPL-MCNC: 6.8 G/DL — SIGNIFICANT CHANGE UP (ref 6–8)
RBC # BLD: 5.29 M/UL — SIGNIFICANT CHANGE UP (ref 4.7–6.1)
RBC # FLD: 12.3 % — SIGNIFICANT CHANGE UP (ref 11.5–14.5)
SODIUM SERPL-SCNC: 137 MMOL/L — SIGNIFICANT CHANGE UP (ref 135–146)
WBC # BLD: 7.54 K/UL — SIGNIFICANT CHANGE UP (ref 4.8–10.8)
WBC # FLD AUTO: 7.54 K/UL — SIGNIFICANT CHANGE UP (ref 4.8–10.8)

## 2022-02-06 PROCEDURE — 99232 SBSQ HOSP IP/OBS MODERATE 35: CPT

## 2022-02-06 RX ORDER — ERTAPENEM SODIUM 1 G/1
1000 INJECTION, POWDER, LYOPHILIZED, FOR SOLUTION INTRAMUSCULAR; INTRAVENOUS EVERY 24 HOURS
Refills: 0 | Status: DISCONTINUED | OUTPATIENT
Start: 2022-02-06 | End: 2022-02-07

## 2022-02-06 RX ORDER — CEFEPIME 1 G/1
1000 INJECTION, POWDER, FOR SOLUTION INTRAMUSCULAR; INTRAVENOUS EVERY 12 HOURS
Refills: 0 | Status: COMPLETED | OUTPATIENT
Start: 2022-02-06 | End: 2022-02-07

## 2022-02-06 RX ADMIN — CEFEPIME 100 MILLIGRAM(S): 1 INJECTION, POWDER, FOR SOLUTION INTRAMUSCULAR; INTRAVENOUS at 17:05

## 2022-02-06 RX ADMIN — CEFEPIME 100 MILLIGRAM(S): 1 INJECTION, POWDER, FOR SOLUTION INTRAMUSCULAR; INTRAVENOUS at 05:59

## 2022-02-06 RX ADMIN — Medication 100 MILLIGRAM(S): at 21:52

## 2022-02-06 RX ADMIN — Medication 100 MILLIGRAM(S): at 13:44

## 2022-02-06 RX ADMIN — POLYETHYLENE GLYCOL 3350 17 GRAM(S): 17 POWDER, FOR SOLUTION ORAL at 11:46

## 2022-02-06 RX ADMIN — Medication 100 MILLIGRAM(S): at 07:49

## 2022-02-06 NOTE — PROGRESS NOTE ADULT - TIME BILLING
Discussed on rounds with Housestaff
Discussed on rounds with Housestaff
Direct patient care. Discussed with Housestaff

## 2022-02-06 NOTE — PROGRESS NOTE ADULT - ASSESSMENT
45 yo M with PMHX of asthma, diverticulosis with frequent episodes of diverticulitis, presented to the ED with c/o severe abdominal pain for 2-3 weeks - worsening for 1 day. Recurrent, uncomplicated diverticulitis Pt evaluated at bedside tolerating diet, ambulating +gas, -bm, midline palced x dc IV ABX.     PLAN:  - Diet as tolerated  - IV abx  - Possible DC today by medicine  - Recommend colonoscopy after resolution of acute process, in 6-8 weeks  - No acute surgical intervention indicated at this moment due to current diverticulitis  - Follow up with Dr. Christensen outpatient for discussion of elective sigmoidectomy      BLUE TEAM SPECTRA: [id number]

## 2022-02-06 NOTE — PROGRESS NOTE ADULT - ASSESSMENT
47 yo M with PMHX of asthma, diverticulosis with frequent episodes of diverticulitis, presented to the ED with c/o  abdominal pain for 2-3 weeks - worsening for 1 day. Patient states that he has been having dull constant Left lower quadrant pain for the past 2-3 weeks, he was treated outpatient with one week course of ciprofloxacin and flagyl by his gastroenterologist; he completed the course of antibiotics about 1 week ago but was having persistent dull LLQ, non -radiating pain 5/10 in severity and daily 2-3 loose bowel movements without blood, today morning he woke up with severe, sharp LLQ abdominal pain.     # Recurrent Sigmoid diverticulitis  - no sepsis  - he was treated outpatient with one week course of ciprofloxacin and flagyl by his gastroenterologist  - CT Abdomen and Pelvis w/ IV Cont (02.03.22 @ 11:18) >Acute sigmoid diverticulitis. No free air or fluid collection.  - c/w pain meds  -c/w Cefepime 2 gm iv q12h, Flagyl 500 mg iv q8h  - Midline placed   - D/c  on Ertapenem 1 gm iv q24h till 2/14  - tolerating advance diet   - surgery eval: : Recommend colonoscopy after resolution of acute process, in 6-8 weeks  - No acute surgical intervention indicated at this moment due to current diverticulitis  - Follow up with Dr. Christensen outpatient for discussion of elective sigmoidectomy    # Constipation  - c/w  miralax    # Asthma- stable    # DVT prophylaxis-low risk for DVT, pt ambulating    # Full code    # Pending: awaiting home care set up for IV antibiotics  # Discussed plan of care with patient  # Disposition: Home

## 2022-02-07 VITALS
OXYGEN SATURATION: 98 % | RESPIRATION RATE: 18 BRPM | SYSTOLIC BLOOD PRESSURE: 138 MMHG | HEART RATE: 91 BPM | TEMPERATURE: 99 F | DIASTOLIC BLOOD PRESSURE: 81 MMHG

## 2022-02-07 LAB
ALBUMIN SERPL ELPH-MCNC: 5 G/DL — SIGNIFICANT CHANGE UP (ref 3.5–5.2)
ALP SERPL-CCNC: 65 U/L — SIGNIFICANT CHANGE UP (ref 30–115)
ALT FLD-CCNC: 27 U/L — SIGNIFICANT CHANGE UP (ref 0–41)
ANION GAP SERPL CALC-SCNC: 13 MMOL/L — SIGNIFICANT CHANGE UP (ref 7–14)
AST SERPL-CCNC: 24 U/L — SIGNIFICANT CHANGE UP (ref 0–41)
BASOPHILS # BLD AUTO: 0.08 K/UL — SIGNIFICANT CHANGE UP (ref 0–0.2)
BASOPHILS NFR BLD AUTO: 0.8 % — SIGNIFICANT CHANGE UP (ref 0–1)
BILIRUB SERPL-MCNC: 0.7 MG/DL — SIGNIFICANT CHANGE UP (ref 0.2–1.2)
BUN SERPL-MCNC: 10 MG/DL — SIGNIFICANT CHANGE UP (ref 10–20)
CALCIUM SERPL-MCNC: 10 MG/DL — SIGNIFICANT CHANGE UP (ref 8.5–10.1)
CHLORIDE SERPL-SCNC: 101 MMOL/L — SIGNIFICANT CHANGE UP (ref 98–110)
CO2 SERPL-SCNC: 26 MMOL/L — SIGNIFICANT CHANGE UP (ref 17–32)
CREAT SERPL-MCNC: 1.1 MG/DL — SIGNIFICANT CHANGE UP (ref 0.7–1.5)
EOSINOPHIL # BLD AUTO: 0.12 K/UL — SIGNIFICANT CHANGE UP (ref 0–0.7)
EOSINOPHIL NFR BLD AUTO: 1.2 % — SIGNIFICANT CHANGE UP (ref 0–8)
GLUCOSE SERPL-MCNC: 94 MG/DL — SIGNIFICANT CHANGE UP (ref 70–99)
HCT VFR BLD CALC: 49.4 % — SIGNIFICANT CHANGE UP (ref 42–52)
HGB BLD-MCNC: 17.1 G/DL — SIGNIFICANT CHANGE UP (ref 14–18)
IMM GRANULOCYTES NFR BLD AUTO: 0.3 % — SIGNIFICANT CHANGE UP (ref 0.1–0.3)
LYMPHOCYTES # BLD AUTO: 2.22 K/UL — SIGNIFICANT CHANGE UP (ref 1.2–3.4)
LYMPHOCYTES # BLD AUTO: 22 % — SIGNIFICANT CHANGE UP (ref 20.5–51.1)
MAGNESIUM SERPL-MCNC: 2.1 MG/DL — SIGNIFICANT CHANGE UP (ref 1.8–2.4)
MCHC RBC-ENTMCNC: 30.2 PG — SIGNIFICANT CHANGE UP (ref 27–31)
MCHC RBC-ENTMCNC: 34.6 G/DL — SIGNIFICANT CHANGE UP (ref 32–37)
MCV RBC AUTO: 87.3 FL — SIGNIFICANT CHANGE UP (ref 80–94)
MONOCYTES # BLD AUTO: 0.7 K/UL — HIGH (ref 0.1–0.6)
MONOCYTES NFR BLD AUTO: 6.9 % — SIGNIFICANT CHANGE UP (ref 1.7–9.3)
NEUTROPHILS # BLD AUTO: 6.96 K/UL — HIGH (ref 1.4–6.5)
NEUTROPHILS NFR BLD AUTO: 68.8 % — SIGNIFICANT CHANGE UP (ref 42.2–75.2)
NRBC # BLD: 0 /100 WBCS — SIGNIFICANT CHANGE UP (ref 0–0)
PLATELET # BLD AUTO: 303 K/UL — SIGNIFICANT CHANGE UP (ref 130–400)
POTASSIUM SERPL-MCNC: 4.4 MMOL/L — SIGNIFICANT CHANGE UP (ref 3.5–5)
POTASSIUM SERPL-SCNC: 4.4 MMOL/L — SIGNIFICANT CHANGE UP (ref 3.5–5)
PROT SERPL-MCNC: 7.6 G/DL — SIGNIFICANT CHANGE UP (ref 6–8)
RBC # BLD: 5.66 M/UL — SIGNIFICANT CHANGE UP (ref 4.7–6.1)
RBC # FLD: 12.3 % — SIGNIFICANT CHANGE UP (ref 11.5–14.5)
SODIUM SERPL-SCNC: 140 MMOL/L — SIGNIFICANT CHANGE UP (ref 135–146)
WBC # BLD: 10.11 K/UL — SIGNIFICANT CHANGE UP (ref 4.8–10.8)
WBC # FLD AUTO: 10.11 K/UL — SIGNIFICANT CHANGE UP (ref 4.8–10.8)

## 2022-02-07 PROCEDURE — 99239 HOSP IP/OBS DSCHRG MGMT >30: CPT

## 2022-02-07 PROCEDURE — 99231 SBSQ HOSP IP/OBS SF/LOW 25: CPT

## 2022-02-07 RX ORDER — ERTAPENEM SODIUM 1 G/1
1 INJECTION, POWDER, LYOPHILIZED, FOR SOLUTION INTRAMUSCULAR; INTRAVENOUS
Qty: 0 | Refills: 0 | DISCHARGE
End: 2022-02-14

## 2022-02-07 RX ORDER — POLYETHYLENE GLYCOL 3350 17 G/17G
17 POWDER, FOR SOLUTION ORAL
Qty: 7 | Refills: 0
Start: 2022-02-07 | End: 2022-02-13

## 2022-02-07 RX ORDER — ACETAMINOPHEN 500 MG
2 TABLET ORAL
Qty: 0 | Refills: 0 | DISCHARGE
Start: 2022-02-07

## 2022-02-07 RX ADMIN — ERTAPENEM SODIUM 120 MILLIGRAM(S): 1 INJECTION, POWDER, LYOPHILIZED, FOR SOLUTION INTRAMUSCULAR; INTRAVENOUS at 00:32

## 2022-02-07 NOTE — PROGRESS NOTE ADULT - ATTENDING COMMENTS
Pt. seen and examined earlier today on rounds with surgical residents.  Agree with above.  Ayaz reports that he tolerated a low residue diet yesterday with continued improvement of LLQ pain. He is concerned because he did not have a bowel movement yesterday.  He did have one 2 days ago.    AVSS  A&OX3, NAD  Abd soft, NT, ND    WBC normal    Plan:  Okay for D/C home from surgical standpoint  Primary team is working on home antibiotics vis midline  F/u with Dr. Christensen as outpatient   D/W Dr. Christensen
Pt. seen and examined this am with surgical resident. Agree with above.  Pt states his LLQ pain is improved but not completely resolved. Tolerating liquid diet.  Patient has had multiple episodes of diverticulitis in the past and so he is familiar with the course of recovery.  He is requesting to stay on a liquid diet at this time, as his pain is not completely resolved.  He said his pain did improve after his antibiotics were changed by infectious disease.    AVSS  A&OX3, NAD  Abd soft, mildly tender LLQ, no rebound or guarding, no  peritoneal signs    WBC normal yesterday    Plan: Continue Abx as per ID  DVT prophylaxis  Continue on liquid diet for now  Pt to see Dr. Christensen as outpatient to discuss elective sigmoid resection for recurrent diverticulitis
Patient is a 46M with PMH of asthma, multiple episodes of uncomplicated diverticulitis, presented with recurrent uncomplicated sigmoid diverticulitis.      Patient seen and examined.  No acute events over night.  Patient is tolerating a diet and passing flatus    Abdomen - soft, non distended, non tender    Vitals labs and images reviewed    CTAP Acute sigmoid diverticulitis. No free air or fluid collection.    Plan  - low fiber diet  - continue antibiotics per ID  - patient will require outpatient colonoscopy 4-6 weeks after discharge  - will plan for elective sigmoidectomy

## 2022-02-07 NOTE — PROGRESS NOTE ADULT - SUBJECTIVE AND OBJECTIVE BOX
SURGERY PROGRESS NOTE    Patient: ELENI BLANKENSHIP , 46y ([birth year])Male   MRN: [id number]  Location: [address]  Visit: 02-03-22 Inpatient  Date: 02-07-22 @ 09:05    Hospital Day #: 5    Procedure/Dx/Injuries: Diverticulitis    Events of past 24 hours: No acute events    PAST MEDICAL & SURGICAL HISTORY:  Diverticulitis    Asthma    No significant past surgical history        Vitals:   T(F): 98.8 (02-07-22 @ 08:00), Max: 98.8 (02-07-22 @ 08:00)  HR: 91 (02-07-22 @ 08:00)  BP: 138/81 (02-07-22 @ 08:00)  RR: 18 (02-07-22 @ 08:00)  SpO2: 98% (02-07-22 @ 08:00)    PHYSICAL EXAM:  General Appearance: AAOX3, NAD  Heart: RRR  Lungs: CTAx2  Abdomen:  soft, non tender, non distended  MSK/Extremities:  mobile, intact ROM     MEDICATIONS  (STANDING):  dicyclomine 20 milliGRAM(s) Oral three times a day before meals  ertapenem  IVPB 1000 milliGRAM(s) IV Intermittent every 24 hours  metroNIDAZOLE  IVPB 500 milliGRAM(s) IV Intermittent every 8 hours  pantoprazole    Tablet 40 milliGRAM(s) Oral before breakfast  polyethylene glycol 3350 17 Gram(s) Oral daily    MEDICATIONS  (PRN):  acetaminophen     Tablet .. 650 milliGRAM(s) Oral every 6 hours PRN Temp greater or equal to 38C (100.4F), Mild Pain (1 - 3), Moderate Pain (4 - 6)  ALBUTerol    90 MICROgram(s) HFA Inhaler 2 Puff(s) Inhalation every 6 hours PRN Shortness of Breath and/or Wheezing  albuterol/ipratropium for Nebulization 3 milliLiter(s) Nebulizer every 6 hours PRN Shortness of Breath and/or Wheezing  ketorolac   Injectable 30 milliGRAM(s) IV Push every 8 hours PRN Severe Pain (7 - 10)  melatonin 3 milliGRAM(s) Oral at bedtime PRN Insomnia  ondansetron Injectable 4 milliGRAM(s) IV Push every 8 hours PRN Nausea and/or Vomiting      DVT PROPHYLAXIS:   GI PROPHYLAXIS: pantoprazole    Tablet 40 milliGRAM(s) Oral before breakfast    ANTICOAGULATION:   ANTIBIOTICS:  ertapenem  IVPB 1000 milliGRAM(s)  metroNIDAZOLE  IVPB 500 milliGRAM(s)    LAB/STUDIES:  Labs:  CAPILLARY BLOOD GLUCOSE                          17.1   10.11 )-----------( 303      ( 07 Feb 2022 05:31 )             49.4       Auto Neutrophil %: 68.8 % (02-07-22 @ 05:31)  Auto Immature Granulocyte %: 0.3 % (02-07-22 @ 05:31)    02-07    140  |  101  |  10  ----------------------------<  94  4.4   |  26  |  1.1      Calcium, Total Serum: 10.0 mg/dL (02-07-22 @ 05:31)      LFTs:             7.6  | 0.7  | 24       ------------------[65      ( 07 Feb 2022 05:31 )  5.0  | x    | 27          Lipase:x      Amylase:x         
ELENI BLANKENSHIP 46y Male  MRN#: [id number]       SUBJECTIVE  Patient is a 46y old Male who presents with a chief complaint of Abdominal Pain (04 Feb 2022 13:38)  Currently admitted to medicine with the primary diagnosis of Diverticulitis      Today is hospital day 1d    INTERVAL HISTORY/OVERNIGHT EVENTS:  No acute events overnight. Patient examined at bedside. Patient found comfortably sitting up in bed watching TV on phone. Patient complains of severe LLQ abdominal pain, but appears very calm and comfortable. Complains of constipation and tenesmus.    OBJECTIVE  PAST MEDICAL & SURGICAL HISTORY  Diverticulitis    Asthma    No significant past surgical history        ALLERGIES:  No Known Allergies      MEDICATIONS:  STANDING MEDICATIONS  cefepime   IVPB      cefepime   IVPB 2000 milliGRAM(s) IV Intermittent once  dicyclomine 20 milliGRAM(s) Oral three times a day before meals  lactated ringers. 1000 milliLiter(s) IV Continuous <Continuous>  metroNIDAZOLE  IVPB 500 milliGRAM(s) IV Intermittent every 8 hours  pantoprazole    Tablet 40 milliGRAM(s) Oral before breakfast  polyethylene glycol 3350 17 Gram(s) Oral daily    PRN MEDICATIONS  acetaminophen     Tablet .. 650 milliGRAM(s) Oral every 6 hours PRN  ALBUTerol    90 MICROgram(s) HFA Inhaler 2 Puff(s) Inhalation every 6 hours PRN  albuterol/ipratropium for Nebulization 3 milliLiter(s) Nebulizer every 6 hours PRN  ketorolac   Injectable 30 milliGRAM(s) IV Push every 8 hours PRN  melatonin 3 milliGRAM(s) Oral at bedtime PRN  ondansetron Injectable 4 milliGRAM(s) IV Push every 8 hours PRN      VITAL SIGNS  T(C): 36.2 (04 Feb 2022 07:48), Max: 37.2 (03 Feb 2022 15:49)  T(F): 97.1 (04 Feb 2022 07:48), Max: 98.9 (03 Feb 2022 15:49)  HR: 75 (04 Feb 2022 07:48) (75 - 84)  BP: 123/75 (04 Feb 2022 07:48) (119/55 - 129/61)  BP(mean): --  RR: 18 (04 Feb 2022 07:48) (18 - 18)  SpO2: 100% (04 Feb 2022 07:48) (98% - 100%)    LABS:                        14.8   9.10  )-----------( 245      ( 04 Feb 2022 04:30 )             43.1     02-04    143  |  105  |  9<L>  ----------------------------<  82  4.3   |  18  |  1.0    Ca    9.2      04 Feb 2022 04:30  Mg     1.8     02-04    TPro  6.3  /  Alb  4.3  /  TBili  0.9  /  DBili  x   /  AST  12  /  ALT  13  /  AlkPhos  61  02-04      Urinalysis Basic - ( 03 Feb 2022 12:05 )    Color: Light Yellow / Appearance: Clear / SG: >1.050 / pH: x  Gluc: x / Ketone: Negative  / Bili: Negative / Urobili: <2 mg/dL   Blood: x / Protein: 30 mg/dL / Nitrite: Negative   Leuk Esterase: Negative / RBC: 1 /HPF / WBC 0 /HPF   Sq Epi: x / Non Sq Epi: 0 /HPF / Bacteria: Negative      Lactate, Blood: 0.7 mmol/L (02-04-22 @ 04:30)      PHYSICAL EXAM:  CONSTITUTIONAL: Well nourished.  NAD  ENT: Airway patent, No thrush  EYES: Clear bilaterally, pupils equal, round and reactive to light.  CARDIAC: Normal rate, regular rhythm. no edema  RESPIRATORY: No wheezing; Normal chest expansion. Not tachypneic, No use of accessory muscles  GASTROINTESTINAL: Abdomen soft, mildly tender to moderate palpation, No guarding, Positive BS  MUSCULOSKELETAL: Range of motion is not limited, No clubbing, cyanosis  NEUROLOGICAL: Alert and oriented, No motor deficits.  SKIN: Skin normal color for race, No evidence of rash    RADIOLOGY:  < from: CT Abdomen and Pelvis w/ IV Cont (02.03.22 @ 11:18) >  Acute sigmoid diverticulitis. No free air or fluid collection.    < end of copied text >  < from: US Testicles (02.03.22 @ 11:28) >  Small bilateral hydroceles. Otherwise unremarkable.    < end of copied text >  
GENERAL SURGERY PROGRESS NOTE    Patient: ELENI BLANKENSHIP , 46y ([birth year])Male   MRN: [id number]  Location: [place] [address] A  Visit: 02-03-22 Inpatient  Date: 02-05-22 @ 02:26    Hospital Day #: 2    Procedure/Dx/Injuries: Diverticulitis    Events of past 24 hours: Pt is tolerating Diet, having Gas, and BM    PAST MEDICAL & SURGICAL HISTORY:  Diverticulitis    Asthma    No significant past surgical history        Vitals:   T(F): 97.1 (02-05-22 @ 00:00), Max: 98.6 (02-04-22 @ 15:21)  HR: 72 (02-05-22 @ 00:00)  BP: 134/76 (02-05-22 @ 00:00)  RR: 18 (02-05-22 @ 00:00)  SpO2: 96% (02-04-22 @ 15:21)      Diet, Full Liquid      Fluids:     I & O's:    PHYSICAL EXAM:  General: NAD, AAOx3, calm and cooperative  HEENT: NCAT, JIM, EOMI, Trachea ML, Neck supple  Cardiac: RRR S1, S2, no Murmurs, rubs or gallops  Respiratory: CTAB, normal respiratory effort, breath sounds equal BL, no wheeze, rhonchi or crackles  Abdomen: Soft, non-distended, LLQ tenderness (improving), no rebound, no guarding. +BS.  Musculoskeletal: Strength 5/5 BL UE/LE, ROM intact, compartments soft    MEDICATIONS  (STANDING):  cefepime   IVPB      cefepime   IVPB 2000 milliGRAM(s) IV Intermittent every 12 hours  dicyclomine 20 milliGRAM(s) Oral three times a day before meals  lactated ringers. 1000 milliLiter(s) (100 mL/Hr) IV Continuous <Continuous>  metroNIDAZOLE  IVPB 500 milliGRAM(s) IV Intermittent every 8 hours  pantoprazole    Tablet 40 milliGRAM(s) Oral before breakfast  polyethylene glycol 3350 17 Gram(s) Oral daily    MEDICATIONS  (PRN):  acetaminophen     Tablet .. 650 milliGRAM(s) Oral every 6 hours PRN Temp greater or equal to 38C (100.4F), Mild Pain (1 - 3), Moderate Pain (4 - 6)  ALBUTerol    90 MICROgram(s) HFA Inhaler 2 Puff(s) Inhalation every 6 hours PRN Shortness of Breath and/or Wheezing  albuterol/ipratropium for Nebulization 3 milliLiter(s) Nebulizer every 6 hours PRN Shortness of Breath and/or Wheezing  ketorolac   Injectable 30 milliGRAM(s) IV Push every 8 hours PRN Severe Pain (7 - 10)  melatonin 3 milliGRAM(s) Oral at bedtime PRN Insomnia  ondansetron Injectable 4 milliGRAM(s) IV Push every 8 hours PRN Nausea and/or Vomiting      DVT PROPHYLAXIS:   GI PROPHYLAXIS: pantoprazole    Tablet 40 milliGRAM(s) Oral before breakfast    ANTICOAGULATION:   ANTIBIOTICS:  cefepime   IVPB    cefepime   IVPB 2000 milliGRAM(s)  metroNIDAZOLE  IVPB 500 milliGRAM(s)      LAB/STUDIES:  Labs:  CAPILLARY BLOOD GLUCOSE                        14.8   9.10  )-----------( 245      ( 04 Feb 2022 04:30 )             43.1       Auto Neutrophil %: 74.3 % (02-04-22 @ 04:30)  Auto Immature Granulocyte %: 0.2 % (02-04-22 @ 04:30)    02-04    143  |  105  |  9<L>  ----------------------------<  82  4.3   |  18  |  1.0      Calcium, Total Serum: 9.2 mg/dL (02-04-22 @ 04:30)      LFTs:             6.3  | 0.9  | 12       ------------------[61      ( 04 Feb 2022 04:30 )  4.3  | x    | 13          Lipase:x      Amylase:x         Lactate, Blood: 0.7 mmol/L (02-04-22 @ 04:30)  Lactate, Blood: 1.1 mmol/L (02-03-22 @ 10:31)      Coags:      Urinalysis Basic - ( 03 Feb 2022 12:05 )    Color: Light Yellow / Appearance: Clear / SG: >1.050 / pH: x  Gluc: x / Ketone: Negative  / Bili: Negative / Urobili: <2 mg/dL   Blood: x / Protein: 30 mg/dL / Nitrite: Negative   Leuk Esterase: Negative / RBC: 1 /HPF / WBC 0 /HPF   Sq Epi: x / Non Sq Epi: 0 /HPF / Bacteria: Negative      Culture - Urine (collected 03 Feb 2022 12:05)  Source: Clean Catch Clean Catch (Midstream)  Final Report (04 Feb 2022 20:34):    No growth    IMAGING:    ACCESS/ DEVICES:  [ ] Peripheral IV  [ ] Central Venous Line	[ ] R	[ ] L	[ ] IJ	[ ] Fem	[ ] SC	Placed:   [ ] Arterial Line		[ ] R	[ ] L	[ ] Fem	[ ] Rad	[ ] Ax	Placed:   [ ] PICC:					[ ] Mediport  [ ] Urinary Catheter,  Date Placed:   [ ] Chest tube: [ ] Right, [ ] Left  [ ] CADEN/Fred Drains      
ELENI BLANKENSHIP 46y Male  MRN#: [id number]       SUBJECTIVE  Patient is a 46y old Male who presents with a chief complaint of Abdominal Pain (05 Feb 2022 09:45)  Currently admitted to medicine with the primary diagnosis of Diverticulitis      Today is hospital day 2d    INTERVAL HISTORY/OVERNIGHT EVENTS:  No acute events overnight. Patient examined at bedside. Endorses improvement in pain. Endorses tolerating PO fluids well.     OBJECTIVE  PAST MEDICAL & SURGICAL HISTORY  Diverticulitis    Asthma    No significant past surgical history        ALLERGIES:  No Known Allergies      MEDICATIONS:  STANDING MEDICATIONS  cefepime   IVPB      cefepime   IVPB 2000 milliGRAM(s) IV Intermittent every 12 hours  dicyclomine 20 milliGRAM(s) Oral three times a day before meals  lactated ringers. 1000 milliLiter(s) IV Continuous <Continuous>  metroNIDAZOLE  IVPB 500 milliGRAM(s) IV Intermittent every 8 hours  pantoprazole    Tablet 40 milliGRAM(s) Oral before breakfast  polyethylene glycol 3350 17 Gram(s) Oral daily    PRN MEDICATIONS  acetaminophen     Tablet .. 650 milliGRAM(s) Oral every 6 hours PRN  ALBUTerol    90 MICROgram(s) HFA Inhaler 2 Puff(s) Inhalation every 6 hours PRN  albuterol/ipratropium for Nebulization 3 milliLiter(s) Nebulizer every 6 hours PRN  ketorolac   Injectable 30 milliGRAM(s) IV Push every 8 hours PRN  melatonin 3 milliGRAM(s) Oral at bedtime PRN  ondansetron Injectable 4 milliGRAM(s) IV Push every 8 hours PRN      VITAL SIGNS  T(C): 36.4 (05 Feb 2022 07:29), Max: 37 (04 Feb 2022 15:21)  T(F): 97.5 (05 Feb 2022 07:29), Max: 98.6 (04 Feb 2022 15:21)  HR: 65 (05 Feb 2022 07:29) (65 - 79)  BP: 129/62 (05 Feb 2022 07:29) (129/62 - 134/76)  BP(mean): --  RR: 18 (05 Feb 2022 07:29) (18 - 18)  SpO2: 99% (05 Feb 2022 07:29) (96% - 99%)    LABS:                        14.8   9.10  )-----------( 245      ( 04 Feb 2022 04:30 )             43.1     02-04    143  |  105  |  9<L>  ----------------------------<  82  4.3   |  18  |  1.0    Ca    9.2      04 Feb 2022 04:30  Mg     1.8     02-04    TPro  6.3  /  Alb  4.3  /  TBili  0.9  /  DBili  x   /  AST  12  /  ALT  13  /  AlkPhos  61  02-04      Urinalysis Basic - ( 03 Feb 2022 12:05 )    Color: Light Yellow / Appearance: Clear / SG: >1.050 / pH: x  Gluc: x / Ketone: Negative  / Bili: Negative / Urobili: <2 mg/dL   Blood: x / Protein: 30 mg/dL / Nitrite: Negative   Leuk Esterase: Negative / RBC: 1 /HPF / WBC 0 /HPF   Sq Epi: x / Non Sq Epi: 0 /HPF / Bacteria: Negative      Culture - Urine (collected 03 Feb 2022 12:05)  Source: Clean Catch Clean Catch (Midstream)  Final Report (04 Feb 2022 20:34):    No growth      PHYSICAL EXAM:  CONSTITUTIONAL: Well nourished.  NAD  ENT: Airway patent, No thrush  EYES: Clear bilaterally, pupils equal, round and reactive to light.  CARDIAC: Normal rate, regular rhythm. no edema  RESPIRATORY: No wheezing; Normal chest expansion. Not tachypneic, No use of accessory muscles  GASTROINTESTINAL: Abdomen soft, non-tender, No guarding, Positive BS  MUSCULOSKELETAL: Range of motion is not limited, No clubbing, cyanosis  NEUROLOGICAL: Alert and oriented, No motor deficits.  SKIN: Skin normal color for race, No evidence of rash    RADIOLOGY:  No new imaging
GENERAL SURGERY PROGRESS NOTE    Patient: ELENI BLANKENSHIP , 46y ([birth year])Male   MRN: [id number]  Location: [address]  Visit: 02-03-22 Inpatient  Date: 02-06-22 @ 02:45      Procedure/Dx/Injuries: diverticulitis    Events of past 24 hours: midline placed    PAST MEDICAL & SURGICAL HISTORY:  Diverticulitis    Asthma    No significant past surgical history        Vitals:   T(F): 97.7 (02-05-22 @ 23:55), Max: 98.2 (02-05-22 @ 15:30)  HR: 72 (02-05-22 @ 23:55)  BP: 129/84 (02-05-22 @ 23:55)  RR: 18 (02-05-22 @ 23:55)  SpO2: 99% (02-05-22 @ 07:29)          Fluids:     I & O's:    02-04-22 @ 07:01  -  02-05-22 @ 07:00  --------------------------------------------------------  IN:    Oral Fluid: 900 mL  Total IN: 900 mL    OUT:  Total OUT: 0 mL    Total NET: 900 mL          PHYSICAL EXAM:  General Appearance: AAOX3, NAD  Heart: RRR  Lungs: CTAx2  Abdomen:  soft, non tender, non distended  MSK/Extremities:  mobile, intact ROM     MEDICATIONS  (STANDING):  cefepime   IVPB      cefepime   IVPB 2000 milliGRAM(s) IV Intermittent every 12 hours  dicyclomine 20 milliGRAM(s) Oral three times a day before meals  lactated ringers. 1000 milliLiter(s) (100 mL/Hr) IV Continuous <Continuous>  metroNIDAZOLE  IVPB 500 milliGRAM(s) IV Intermittent every 8 hours  pantoprazole    Tablet 40 milliGRAM(s) Oral before breakfast  polyethylene glycol 3350 17 Gram(s) Oral daily    MEDICATIONS  (PRN):  acetaminophen     Tablet .. 650 milliGRAM(s) Oral every 6 hours PRN Temp greater or equal to 38C (100.4F), Mild Pain (1 - 3), Moderate Pain (4 - 6)  ALBUTerol    90 MICROgram(s) HFA Inhaler 2 Puff(s) Inhalation every 6 hours PRN Shortness of Breath and/or Wheezing  albuterol/ipratropium for Nebulization 3 milliLiter(s) Nebulizer every 6 hours PRN Shortness of Breath and/or Wheezing  ketorolac   Injectable 30 milliGRAM(s) IV Push every 8 hours PRN Severe Pain (7 - 10)  melatonin 3 milliGRAM(s) Oral at bedtime PRN Insomnia  ondansetron Injectable 4 milliGRAM(s) IV Push every 8 hours PRN Nausea and/or Vomiting      DVT PROPHYLAXIS:   GI PROPHYLAXIS: pantoprazole    Tablet 40 milliGRAM(s) Oral before breakfast    ANTICOAGULATION:   ANTIBIOTICS:  cefepime   IVPB    cefepime   IVPB 2000 milliGRAM(s)  metroNIDAZOLE  IVPB 500 milliGRAM(s)            LAB/STUDIES:  Labs:  CAPILLARY BLOOD GLUCOSE                              16.2   7.00  )-----------( 271      ( 05 Feb 2022 16:00 )             47.3       Auto Neutrophil %: 70.6 % (02-05-22 @ 16:00)  Auto Immature Granulocyte %: 0.3 % (02-05-22 @ 16:00)    02-05    142  |  105  |  11  ----------------------------<  109<H>  5.3<H>   |  27  |  1.1      Calcium, Total Serum: 9.7 mg/dL (02-05-22 @ 16:00)      LFTs:             7.4  | 0.4  | 16       ------------------[63      ( 05 Feb 2022 16:00 )  4.7  | x    | 16          Lipase:x      Amylase:x         Lactate, Blood: 0.7 mmol/L (02-04-22 @ 04:30)  Lactate, Blood: 1.1 mmol/L (02-03-22 @ 10:31)      Coags:                Culture - Urine (collected 03 Feb 2022 12:05)  Source: Clean Catch Clean Catch (Midstream)  Final Report (04 Feb 2022 20:34):    No growth              IMAGING:      ACCESS/ DEVICES:  [ ] Peripheral IV  [ ] Central Venous Line	[ ] R	[ ] L	[ ] IJ	[ ] Fem	[ ] SC	Placed:   [ ] Arterial Line		[ ] R	[ ] L	[ ] Fem	[ ] Rad	[ ] Ax	Placed:   [ ] PICC:					[ ] Mediport  [ ] Urinary Catheter,  Date Placed:   [ ] Chest tube: [ ] Right, [ ] Left  [ ] CADEN/Fred Drains            
Patient is a 46y old  Male who presents with a chief complaint of Abdominal Pain (04 Feb 2022 00:46)    Patient was seen and examined.  c/o LLQ abd pain  C/o constipation  Denies any other complaints.  All systems reviewed positive history as mentioned above.    PAST MEDICAL & SURGICAL HISTORY:  Diverticulitis  Asthma    No significant past surgical history    Allergies  No Known Allergies    MEDICATIONS  (STANDING):  ciprofloxacin   IVPB 400 milliGRAM(s) IV Intermittent every 12 hours  lactated ringers. 1000 milliLiter(s) (100 mL/Hr) IV Continuous <Continuous>  metroNIDAZOLE  IVPB 500 milliGRAM(s) IV Intermittent every 8 hours  pantoprazole    Tablet 40 milliGRAM(s) Oral before breakfast  polyethylene glycol 3350 17 Gram(s) Oral daily    MEDICATIONS  (PRN):  acetaminophen     Tablet .. 650 milliGRAM(s) Oral every 6 hours PRN Temp greater or equal to 38C (100.4F), Mild Pain (1 - 3), Moderate Pain (4 - 6)  ALBUTerol    90 MICROgram(s) HFA Inhaler 2 Puff(s) Inhalation every 6 hours PRN Shortness of Breath and/or Wheezing  albuterol/ipratropium for Nebulization 3 milliLiter(s) Nebulizer every 6 hours PRN Shortness of Breath and/or Wheezing  ketorolac   Injectable 30 milliGRAM(s) IV Push every 8 hours PRN Severe Pain (7 - 10)  melatonin 3 milliGRAM(s) Oral at bedtime PRN Insomnia  ondansetron Injectable 4 milliGRAM(s) IV Push every 8 hours PRN Nausea and/or Vomiting    Vital Signs Last 24 Hrs  T(C): 36.2  T(F): 97.1  HR: 75  BP: 123/75  BP(mean): --  RR: 18  SpO2: 100%    O/E:  Awake, alert, not in distress.  HEENT: atraumatic, EOMI.  Chest: clear.  CVS: SIS2 +, no murmur.  P/A: Soft, BS+, LLQ tenderness  CNS: awake, alert  Ext: no edema feet.  Skin: no rash, no ulcers.  All systems reviewed positive findings as above.                          14.8   9.10  )-----------( 245      ( 04 Feb 2022 04:30 )             43.1                         16.5   11.68<H> )-----------( 267      ( 03 Feb 2022 10:31 )             48.5     02-04    143  |  105  |  9<L>  ----------------------------<  82  4.3   |  18  |  1.0  02-03    140  |  102  |  11  ----------------------------<  96  4.7   |  26  |  1.0    Ca    9.2      04 Feb 2022 04:30  Ca    9.6      03 Feb 2022 10:31  Mg     1.8     02-04    TPro  6.3  /  Alb  4.3  /  TBili  0.9  /  DBili  x   /  AST  12  /  ALT  13  /  AlkPhos  61  02-04  TPro  7.5  /  Alb  5.0  /  TBili  0.5  /  DBili  x   /  AST  14  /  ALT  15  /  AlkPhos  66  02-03            Urinalysis Basic - ( 03 Feb 2022 12:05 )    Color: Light Yellow / Appearance: Clear / SG: >1.050 / pH: x  Gluc: x / Ketone: Negative  / Bili: Negative / Urobili: <2 mg/dL   Blood: x / Protein: 30 mg/dL / Nitrite: Negative   Leuk Esterase: Negative / RBC: 1 /HPF / WBC 0 /HPF   Sq Epi: x / Non Sq Epi: 0 /HPF / Bacteria: Negative            
Patient is a 46y old  Male who presents with a chief complaint of Abdominal Pain (04 Feb 2022 00:46)    Patient was seen and examined.  no new events    PAST MEDICAL & SURGICAL HISTORY:  Diverticulitis  Asthma    No significant past surgical history    Allergies  No Known Allergies    MEDICATIONS  (STANDING):  cefepime   IVPB      cefepime   IVPB 2000 milliGRAM(s) IV Intermittent every 12 hours  dicyclomine 20 milliGRAM(s) Oral three times a day before meals  lactated ringers. 1000 milliLiter(s) (100 mL/Hr) IV Continuous <Continuous>  metroNIDAZOLE  IVPB 500 milliGRAM(s) IV Intermittent every 8 hours  pantoprazole    Tablet 40 milliGRAM(s) Oral before breakfast  polyethylene glycol 3350 17 Gram(s) Oral daily    MEDICATIONS  (PRN):  acetaminophen     Tablet .. 650 milliGRAM(s) Oral every 6 hours PRN Temp greater or equal to 38C (100.4F), Mild Pain (1 - 3), Moderate Pain (4 - 6)  ALBUTerol    90 MICROgram(s) HFA Inhaler 2 Puff(s) Inhalation every 6 hours PRN Shortness of Breath and/or Wheezing  albuterol/ipratropium for Nebulization 3 milliLiter(s) Nebulizer every 6 hours PRN Shortness of Breath and/or Wheezing  ketorolac   Injectable 30 milliGRAM(s) IV Push every 8 hours PRN Severe Pain (7 - 10)  melatonin 3 milliGRAM(s) Oral at bedtime PRN Insomnia  ondansetron Injectable 4 milliGRAM(s) IV Push every 8 hours PRN Nausea and/or Vomiting    T(C): 36.4 (02-06-22 @ 08:00), Max: 36.8 (02-05-22 @ 15:30)  HR: 75 (02-06-22 @ 08:00) (72 - 76)  BP: 121/68 (02-06-22 @ 08:00) (121/68 - 129/84)  RR: 18 (02-06-22 @ 08:00) (18 - 18)  SpO2: 97% (02-06-22 @ 08:00) (97% - 97%)    O/E:  Awake, alert, not in distress.  HEENT: atraumatic, EOMI.  Chest: clear.  CVS: SIS2 +, no murmur.  P/A: Soft, BS+  CNS: awake, alert  Ext: no edema feet.  Skin: no rash, no ulcers.  All systems reviewed positive findings as above.                            15.9   7.54  )-----------( 250      ( 06 Feb 2022 06:39 )             46.5                         16.2   7.00  )-----------( 271      ( 05 Feb 2022 16:00 )             47.3   02-06    137  |  103  |  9<L>  ----------------------------<  105<H>  4.8   |  25  |  1.0  02-05    142  |  105  |  11  ----------------------------<  109<H>  5.3<H>   |  27  |  1.1    Ca    9.6      06 Feb 2022 06:39  Ca    9.7      05 Feb 2022 16:00  Mg     2.0     02-06    TPro  6.8  /  Alb  4.5  /  TBili  0.8  /  DBili  x   /  AST  21  /  ALT  20  /  AlkPhos  62  02-06  TPro  7.4  /  Alb  4.7  /  TBili  0.4  /  DBili  x   /  AST  16  /  ALT  16  /  AlkPhos  63  02-05  
Patient is a 46y old  Male who presents with a chief complaint of Abdominal Pain (04 Feb 2022 00:46)    Patient was seen and examined.  no new events    PAST MEDICAL & SURGICAL HISTORY:  Diverticulitis  Asthma    No significant past surgical history    Allergies  No Known Allergies    MEDICATIONS  (STANDING):  cefepime   IVPB      cefepime   IVPB 2000 milliGRAM(s) IV Intermittent every 12 hours  dicyclomine 20 milliGRAM(s) Oral three times a day before meals  lactated ringers. 1000 milliLiter(s) (100 mL/Hr) IV Continuous <Continuous>  metroNIDAZOLE  IVPB 500 milliGRAM(s) IV Intermittent every 8 hours  pantoprazole    Tablet 40 milliGRAM(s) Oral before breakfast  polyethylene glycol 3350 17 Gram(s) Oral daily    MEDICATIONS  (PRN):  acetaminophen     Tablet .. 650 milliGRAM(s) Oral every 6 hours PRN Temp greater or equal to 38C (100.4F), Mild Pain (1 - 3), Moderate Pain (4 - 6)  ALBUTerol    90 MICROgram(s) HFA Inhaler 2 Puff(s) Inhalation every 6 hours PRN Shortness of Breath and/or Wheezing  albuterol/ipratropium for Nebulization 3 milliLiter(s) Nebulizer every 6 hours PRN Shortness of Breath and/or Wheezing  ketorolac   Injectable 30 milliGRAM(s) IV Push every 8 hours PRN Severe Pain (7 - 10)  melatonin 3 milliGRAM(s) Oral at bedtime PRN Insomnia  ondansetron Injectable 4 milliGRAM(s) IV Push every 8 hours PRN Nausea and/or Vomiting    T(C): 36.4 (02-05-22 @ 07:29), Max: 37 (02-04-22 @ 15:21)  HR: 65 (02-05-22 @ 07:29) (65 - 79)  BP: 129/62 (02-05-22 @ 07:29) (129/62 - 134/76)  RR: 18 (02-05-22 @ 07:29) (18 - 18)  SpO2: 99% (02-05-22 @ 07:29) (96% - 99%)    O/E:  Awake, alert, not in distress.  HEENT: atraumatic, EOMI.  Chest: clear.  CVS: SIS2 +, no murmur.  P/A: Soft, BS+  CNS: awake, alert  Ext: no edema feet.  Skin: no rash, no ulcers.  All systems reviewed positive findings as above.                               14.8   9.10  )-----------( 245      ( 04 Feb 2022 04:30 )             43.1   02-04    143  |  105  |  9<L>  ----------------------------<  82  4.3   |  18  |  1.0    Ca    9.2      04 Feb 2022 04:30  Mg     1.8     02-04    TPro  6.3  /  Alb  4.3  /  TBili  0.9  /  DBili  x   /  AST  12  /  ALT  13  /  AlkPhos  61  02-04

## 2022-02-07 NOTE — PROGRESS NOTE ADULT - ASSESSMENT
47 yo M with PMHX of asthma, diverticulosis with frequent episodes of diverticulitis, presented to the ED with c/o severe abdominal pain for 2-3 weeks - worsening for 1 day. Recurrent, uncomplicated diverticulitis Pt evaluated at bedside tolerating diet, ambulating +gas, -bm, midline palced x dc IV ABX.     PLAN:  - Diet as tolerated  - IV abx  - DC today by medicine  - Recommend colonoscopy after resolution of acute process, in 6-8 weeks  - No acute surgical intervention indicated at this moment due to current diverticulitis  - Follow up with Dr. Christensen outpatient for discussion of elective sigmoidectomy      BLUE TEAM SPECTRA: [id number]   45 yo M with PMHX of asthma, diverticulosis with frequent episodes of diverticulitis, presented to the ED with c/o severe abdominal pain for 2-3 weeks - worsening for 1 day. Recurrent, uncomplicated diverticulitis Pt evaluated at bedside tolerating diet, ambulating +gas, -bm, midline palced x dc IV ABX.     PLAN:  - Diet as tolerated  - IV abx  - DC today by medicine  - Recommend colonoscopy after resolution of acute process, in 6-8 weeks  - No acute surgical intervention indicated at this moment due to current diverticulitis  - Follow up with Dr. Christensen (appointment was scheduled 9 AM 2/14/22) outpatient for discussion of elective sigmoidectomy      BLUE TEAM SPECTRA: [id number]

## 2022-02-07 NOTE — CHART NOTE - NSCHARTNOTEFT_GEN_A_CORE
patient seen and examined at bed side  no major event overnight  tolerating PO intake   reporting loose stools  no abdominal pain  will be discharged on IV antibiotics till 2/14/2022  will follow up with ID and colorectal surgeon   has to do colonoscopy as outpatient

## 2022-02-07 NOTE — PROGRESS NOTE ADULT - PROVIDER SPECIALTY LIST ADULT
Surgery
Colorectal Surgery
Hospitalist
Internal Medicine
Internal Medicine
Surgery
Hospitalist
Hospitalist

## 2022-02-09 DIAGNOSIS — K59.00 CONSTIPATION, UNSPECIFIED: ICD-10-CM

## 2022-02-09 DIAGNOSIS — F12.90 CANNABIS USE, UNSPECIFIED, UNCOMPLICATED: ICD-10-CM

## 2022-02-09 DIAGNOSIS — J45.909 UNSPECIFIED ASTHMA, UNCOMPLICATED: ICD-10-CM

## 2022-02-09 DIAGNOSIS — K57.32 DIVERTICULITIS OF LARGE INTESTINE WITHOUT PERFORATION OR ABSCESS WITHOUT BLEEDING: ICD-10-CM

## 2022-02-14 ENCOUNTER — APPOINTMENT (OUTPATIENT)
Dept: SURGERY | Facility: CLINIC | Age: 47
End: 2022-02-14

## 2022-07-03 NOTE — CONSULT NOTE ADULT - PROVIDER SPECIALTY LIST ADULT
Colorectal Surgery
Infectious Disease
EK - attempted to stand/ambulate pt in anticipation of dc and pt still symptomatic on standing though steady on her feet.  Will place IV and given 1L LR and zofran.

## 2022-09-02 ENCOUNTER — EMERGENCY (EMERGENCY)
Facility: HOSPITAL | Age: 47
LOS: 0 days | Discharge: HOME | End: 2022-09-02
Attending: EMERGENCY MEDICINE | Admitting: EMERGENCY MEDICINE

## 2022-09-02 VITALS
SYSTOLIC BLOOD PRESSURE: 132 MMHG | RESPIRATION RATE: 20 BRPM | WEIGHT: 225.09 LBS | HEIGHT: 72 IN | TEMPERATURE: 98 F | HEART RATE: 95 BPM | OXYGEN SATURATION: 95 % | DIASTOLIC BLOOD PRESSURE: 86 MMHG

## 2022-09-02 DIAGNOSIS — Z87.19 PERSONAL HISTORY OF OTHER DISEASES OF THE DIGESTIVE SYSTEM: ICD-10-CM

## 2022-09-02 DIAGNOSIS — R10.32 LEFT LOWER QUADRANT PAIN: ICD-10-CM

## 2022-09-02 DIAGNOSIS — K57.92 DIVERTICULITIS OF INTESTINE, PART UNSPECIFIED, WITHOUT PERFORATION OR ABSCESS WITHOUT BLEEDING: ICD-10-CM

## 2022-09-02 LAB
ALBUMIN SERPL ELPH-MCNC: 4.8 G/DL — SIGNIFICANT CHANGE UP (ref 3.5–5.2)
ALP SERPL-CCNC: 59 U/L — SIGNIFICANT CHANGE UP (ref 30–115)
ALT FLD-CCNC: 14 U/L — SIGNIFICANT CHANGE UP (ref 0–41)
ANION GAP SERPL CALC-SCNC: 9 MMOL/L — SIGNIFICANT CHANGE UP (ref 7–14)
AST SERPL-CCNC: 16 U/L — SIGNIFICANT CHANGE UP (ref 0–41)
BASOPHILS # BLD AUTO: 0.07 K/UL — SIGNIFICANT CHANGE UP (ref 0–0.2)
BASOPHILS NFR BLD AUTO: 1 % — SIGNIFICANT CHANGE UP (ref 0–1)
BILIRUB SERPL-MCNC: 0.4 MG/DL — SIGNIFICANT CHANGE UP (ref 0.2–1.2)
BUN SERPL-MCNC: 11 MG/DL — SIGNIFICANT CHANGE UP (ref 10–20)
CALCIUM SERPL-MCNC: 9.5 MG/DL — SIGNIFICANT CHANGE UP (ref 8.5–10.1)
CHLORIDE SERPL-SCNC: 106 MMOL/L — SIGNIFICANT CHANGE UP (ref 98–110)
CO2 SERPL-SCNC: 26 MMOL/L — SIGNIFICANT CHANGE UP (ref 17–32)
CREAT SERPL-MCNC: 0.9 MG/DL — SIGNIFICANT CHANGE UP (ref 0.7–1.5)
EGFR: 105 ML/MIN/1.73M2 — SIGNIFICANT CHANGE UP
EOSINOPHIL # BLD AUTO: 0.02 K/UL — SIGNIFICANT CHANGE UP (ref 0–0.7)
EOSINOPHIL NFR BLD AUTO: 0.3 % — SIGNIFICANT CHANGE UP (ref 0–8)
GLUCOSE SERPL-MCNC: 117 MG/DL — HIGH (ref 70–99)
HCT VFR BLD CALC: 46.3 % — SIGNIFICANT CHANGE UP (ref 42–52)
HGB BLD-MCNC: 16.2 G/DL — SIGNIFICANT CHANGE UP (ref 14–18)
IMM GRANULOCYTES NFR BLD AUTO: 0.6 % — HIGH (ref 0.1–0.3)
LACTATE SERPL-SCNC: 1.4 MMOL/L — SIGNIFICANT CHANGE UP (ref 0.7–2)
LIDOCAIN IGE QN: 22 U/L — SIGNIFICANT CHANGE UP (ref 7–60)
LYMPHOCYTES # BLD AUTO: 1.24 K/UL — SIGNIFICANT CHANGE UP (ref 1.2–3.4)
LYMPHOCYTES # BLD AUTO: 17.6 % — LOW (ref 20.5–51.1)
MCHC RBC-ENTMCNC: 31.5 PG — HIGH (ref 27–31)
MCHC RBC-ENTMCNC: 35 G/DL — SIGNIFICANT CHANGE UP (ref 32–37)
MCV RBC AUTO: 90.1 FL — SIGNIFICANT CHANGE UP (ref 80–94)
MONOCYTES # BLD AUTO: 0.33 K/UL — SIGNIFICANT CHANGE UP (ref 0.1–0.6)
MONOCYTES NFR BLD AUTO: 4.7 % — SIGNIFICANT CHANGE UP (ref 1.7–9.3)
NEUTROPHILS # BLD AUTO: 5.34 K/UL — SIGNIFICANT CHANGE UP (ref 1.4–6.5)
NEUTROPHILS NFR BLD AUTO: 75.8 % — HIGH (ref 42.2–75.2)
NRBC # BLD: 0 /100 WBCS — SIGNIFICANT CHANGE UP (ref 0–0)
PLATELET # BLD AUTO: 284 K/UL — SIGNIFICANT CHANGE UP (ref 130–400)
POTASSIUM SERPL-MCNC: 4.7 MMOL/L — SIGNIFICANT CHANGE UP (ref 3.5–5)
POTASSIUM SERPL-SCNC: 4.7 MMOL/L — SIGNIFICANT CHANGE UP (ref 3.5–5)
PROT SERPL-MCNC: 7.1 G/DL — SIGNIFICANT CHANGE UP (ref 6–8)
RBC # BLD: 5.14 M/UL — SIGNIFICANT CHANGE UP (ref 4.7–6.1)
RBC # FLD: 12.4 % — SIGNIFICANT CHANGE UP (ref 11.5–14.5)
SODIUM SERPL-SCNC: 141 MMOL/L — SIGNIFICANT CHANGE UP (ref 135–146)
WBC # BLD: 7.04 K/UL — SIGNIFICANT CHANGE UP (ref 4.8–10.8)
WBC # FLD AUTO: 7.04 K/UL — SIGNIFICANT CHANGE UP (ref 4.8–10.8)

## 2022-09-02 PROCEDURE — 74177 CT ABD & PELVIS W/CONTRAST: CPT | Mod: 26,MA

## 2022-09-02 PROCEDURE — 99285 EMERGENCY DEPT VISIT HI MDM: CPT

## 2022-09-02 PROCEDURE — 76870 US EXAM SCROTUM: CPT | Mod: 26

## 2022-09-02 RX ORDER — SODIUM CHLORIDE 9 MG/ML
1000 INJECTION INTRAMUSCULAR; INTRAVENOUS; SUBCUTANEOUS ONCE
Refills: 0 | Status: COMPLETED | OUTPATIENT
Start: 2022-09-02 | End: 2022-09-02

## 2022-09-02 RX ORDER — METRONIDAZOLE 500 MG
1 TABLET ORAL
Qty: 30 | Refills: 0
Start: 2022-09-02 | End: 2022-09-11

## 2022-09-02 RX ORDER — MOXIFLOXACIN HYDROCHLORIDE TABLETS, 400 MG 400 MG/1
1 TABLET, FILM COATED ORAL
Qty: 20 | Refills: 0
Start: 2022-09-02 | End: 2022-09-11

## 2022-09-02 RX ADMIN — SODIUM CHLORIDE 1000 MILLILITER(S): 9 INJECTION INTRAMUSCULAR; INTRAVENOUS; SUBCUTANEOUS at 09:35

## 2022-09-02 NOTE — ED ADULT NURSE NOTE - NSFALLRSKOUTCOME_ED_ALL_ED
High Blood Pressure Brother        Vitals:  /60   Pulse 79   Temp 98.7 °F (37.1 °C) (Oral)   Resp 14   Ht 5' 5\" (1.651 m)   Wt 138 lb 6.4 oz (62.8 kg)   SpO2 100%   BMI 23.03 kg/m²   Temp (24hrs), Av.6 °F (37 °C), Min:97.6 °F (36.4 °C), Max:99 °F (37.2 °C)    Recent Labs     19  17019  1309 19  1416   POCGLU 173* 177* 128* 119*       I/O (24Hr): Intake/Output Summary (Last 24 hours) at 2019 1537  Last data filed at 2019 1433  Gross per 24 hour   Intake 3353 ml   Output 2843 ml   Net 510 ml       Labs:  Hematology:  Recent Labs     19  0619  0537   WBC 17.3* 9.4   RBC 3.95* 4.11*   HGB 10.9* 11.8*   HCT 35.8* 37.0*   MCV 90.6 90.0   MCH 27.6 28.7   MCHC 30.4 31.9   RDW 12.5 12.4    566*   MPV 10.0 9.1     Chemistry:  Recent Labs     19  0619  0537   * 137   K 3.7 4.8   CL 97* 100   CO2 25 28   GLUCOSE 244* 204*   BUN 8 6   CREATININE 0.54* 0.46*   ANIONGAP 12 9   LABGLOM >60 >60   GFRAA >60 >60   CALCIUM 8.8 9.1     Recent Labs     19  0658 19  1134 19  17019  1309 19  1416   POCGLU 213* 294* 173* 177* 128* 119*     ABG:  Lab Results   Component Value Date    POCPH 7.451 2019    POCPCO2 44.1 2019    POCPO2 72.4 2019    POCHCO3 30.8 2019    NBEA NOT REPORTED 2019    PBEA 6 2019    QDV5OLK 32 2019    ZTAF8DFK 95 2019    FIO2 40.0 2019     Lab Results   Component Value Date/Time    SPECIAL NOT REPORTED 2019 04:08 PM     Lab Results   Component Value Date/Time    CULTURE (A) 2019 04:08 PM     STAPHYLOCOCCUS SPECIES, COAGULASE NEGATIVE SCANT GROWTH Susceptibilities have not been performed. Notify the laboratory within 7 days for further workup if clinically indicated.     CULTURE NO ANAEROBIC ORGANISMS ISOLATED AT 5 DAYS (A) 2019 04:08 PM       Radiology:  Katiana Antu Foot Right (min 3 Views)    Result Date: Universal Safety Interventions

## 2022-09-02 NOTE — ED PROVIDER NOTE - PHYSICAL EXAMINATION
CON: ao x 3, HENMT: neck supple,  CV: s1s2 ctab, RESP: cta b/l, GI:  soft, mild llq ttp w/o r/g. (chaperone PCA Asif no testicular tenderness or rash, left cremasteric reflex intact, no rebound, no guarding, SKIN: no rash, MSK: no deformities, NEURO: no gross motor or sensory deficit Psychiatric: appropriate mood, appropriate affect

## 2022-09-02 NOTE — ED PROVIDER NOTE - PATIENT PORTAL LINK FT
You can access the FollowMyHealth Patient Portal offered by [facility] by registering at the following website: [email or web address]. By joining [employer]’s FollowMyHealth portal, you will also be able to view your health information using other applications (apps) compatible with our system.

## 2022-09-02 NOTE — ED PROVIDER NOTE - CLINICAL SUMMARY MEDICAL DECISION MAKING FREE TEXT BOX
Patient presented evaluation abdominal pain found to have diverticulitis.  I discussed with the patient that he will need outpatient CAT scan due to the pulm nodule.  I also made patient aware of the ultrasound demonstrating a hydrocele.  Patient stable for discharge outpatient GI follow-up discharge on p.o. antibiotics

## 2022-09-02 NOTE — ED PROVIDER NOTE - NSFOLLOWUPINSTRUCTIONS_ED_ALL_ED_FT
follow up with your gastroenterologist in the next 7 - 10 days    Diverticulitis    Diverticulitis is inflammation or infection of small pouches in your colon that form when you HAVE a condition called diverticulosis. This condition can range from mild to severe potentially leading to perforation or obstructions of your colon. Symptoms include abdominal pain, fever/chills, nausea, vomiting, diarrhea, constipation, or blood in your stool. If you were prescribed an antibiotic medicine, take it as told by your health care provider. Do not stop taking the antibiotic even if you start to feel better.    SEEK IMMEDIATE MEDICAL CARE IF YOU HAVE ANY OF THE FOLLOWING SYMPTOMS: worsening abdominal pain, high fever, inability to hold down liquids or medication, black or bloody stools, inability to pass gas, lightheadedness/dizziness, or a change in mental status.

## 2022-09-02 NOTE — ED PROVIDER NOTE - OBJECTIVE STATEMENT
47-year-old male with history of diverticulitis here for evaluation of 3 days of left lower quadrant\left groin abdominal pain.  Patient complaining of dull sensation to the left lower quadrant back/left groin feels slightly different from his previous episodes of diverticulitis.  He has no urinary complaints fever chills and the pain does not radiate.  Patient is concerned that his pain might be not related to diverticulitis perhaps and may be testicular in etiology.

## 2022-12-07 ENCOUNTER — EMERGENCY (EMERGENCY)
Facility: HOSPITAL | Age: 47
LOS: 0 days | Discharge: HOME | End: 2022-12-07
Attending: EMERGENCY MEDICINE | Admitting: EMERGENCY MEDICINE

## 2022-12-07 VITALS
DIASTOLIC BLOOD PRESSURE: 90 MMHG | WEIGHT: 225.09 LBS | OXYGEN SATURATION: 97 % | HEART RATE: 87 BPM | SYSTOLIC BLOOD PRESSURE: 137 MMHG | TEMPERATURE: 99 F | RESPIRATION RATE: 16 BRPM

## 2022-12-07 DIAGNOSIS — R53.1 WEAKNESS: ICD-10-CM

## 2022-12-07 DIAGNOSIS — R10.12 LEFT UPPER QUADRANT PAIN: ICD-10-CM

## 2022-12-07 DIAGNOSIS — K57.90 DIVERTICULOSIS OF INTESTINE, PART UNSPECIFIED, WITHOUT PERFORATION OR ABSCESS WITHOUT BLEEDING: ICD-10-CM

## 2022-12-07 DIAGNOSIS — R11.0 NAUSEA: ICD-10-CM

## 2022-12-07 DIAGNOSIS — N43.3 HYDROCELE, UNSPECIFIED: ICD-10-CM

## 2022-12-07 DIAGNOSIS — Z20.822 CONTACT WITH AND (SUSPECTED) EXPOSURE TO COVID-19: ICD-10-CM

## 2022-12-07 DIAGNOSIS — Z87.19 PERSONAL HISTORY OF OTHER DISEASES OF THE DIGESTIVE SYSTEM: ICD-10-CM

## 2022-12-07 DIAGNOSIS — J45.909 UNSPECIFIED ASTHMA, UNCOMPLICATED: ICD-10-CM

## 2022-12-07 DIAGNOSIS — R42 DIZZINESS AND GIDDINESS: ICD-10-CM

## 2022-12-07 LAB
ALBUMIN SERPL ELPH-MCNC: 5.4 G/DL — HIGH (ref 3.5–5.2)
ALP SERPL-CCNC: 67 U/L — SIGNIFICANT CHANGE UP (ref 30–115)
ALT FLD-CCNC: 21 U/L — SIGNIFICANT CHANGE UP (ref 0–41)
ANION GAP SERPL CALC-SCNC: 12 MMOL/L — SIGNIFICANT CHANGE UP (ref 7–14)
APPEARANCE UR: CLEAR — SIGNIFICANT CHANGE UP
AST SERPL-CCNC: 16 U/L — SIGNIFICANT CHANGE UP (ref 0–41)
BASOPHILS # BLD AUTO: 0.1 K/UL — SIGNIFICANT CHANGE UP (ref 0–0.2)
BASOPHILS NFR BLD AUTO: 1 % — SIGNIFICANT CHANGE UP (ref 0–1)
BILIRUB SERPL-MCNC: 0.4 MG/DL — SIGNIFICANT CHANGE UP (ref 0.2–1.2)
BILIRUB UR-MCNC: NEGATIVE — SIGNIFICANT CHANGE UP
BUN SERPL-MCNC: 10 MG/DL — SIGNIFICANT CHANGE UP (ref 10–20)
CALCIUM SERPL-MCNC: 10.2 MG/DL — SIGNIFICANT CHANGE UP (ref 8.4–10.4)
CHLORIDE SERPL-SCNC: 101 MMOL/L — SIGNIFICANT CHANGE UP (ref 98–110)
CO2 SERPL-SCNC: 27 MMOL/L — SIGNIFICANT CHANGE UP (ref 17–32)
COLOR SPEC: SIGNIFICANT CHANGE UP
CREAT SERPL-MCNC: 0.9 MG/DL — SIGNIFICANT CHANGE UP (ref 0.7–1.5)
DIFF PNL FLD: NEGATIVE — SIGNIFICANT CHANGE UP
EGFR: 105 ML/MIN/1.73M2 — SIGNIFICANT CHANGE UP
EOSINOPHIL # BLD AUTO: 0.06 K/UL — SIGNIFICANT CHANGE UP (ref 0–0.7)
EOSINOPHIL NFR BLD AUTO: 0.6 % — SIGNIFICANT CHANGE UP (ref 0–8)
FLUAV AG NPH QL: SIGNIFICANT CHANGE UP
FLUBV AG NPH QL: SIGNIFICANT CHANGE UP
GLUCOSE SERPL-MCNC: 103 MG/DL — HIGH (ref 70–99)
GLUCOSE UR QL: NEGATIVE — SIGNIFICANT CHANGE UP
HCT VFR BLD CALC: 49.3 % — SIGNIFICANT CHANGE UP (ref 42–52)
HGB BLD-MCNC: 16.6 G/DL — SIGNIFICANT CHANGE UP (ref 14–18)
IMM GRANULOCYTES NFR BLD AUTO: 0.3 % — SIGNIFICANT CHANGE UP (ref 0.1–0.3)
KETONES UR-MCNC: NEGATIVE — SIGNIFICANT CHANGE UP
LEUKOCYTE ESTERASE UR-ACNC: NEGATIVE — SIGNIFICANT CHANGE UP
LIDOCAIN IGE QN: 19 U/L — SIGNIFICANT CHANGE UP (ref 7–60)
LYMPHOCYTES # BLD AUTO: 1.66 K/UL — SIGNIFICANT CHANGE UP (ref 1.2–3.4)
LYMPHOCYTES # BLD AUTO: 16.5 % — LOW (ref 20.5–51.1)
MCHC RBC-ENTMCNC: 30.2 PG — SIGNIFICANT CHANGE UP (ref 27–31)
MCHC RBC-ENTMCNC: 33.7 G/DL — SIGNIFICANT CHANGE UP (ref 32–37)
MCV RBC AUTO: 89.8 FL — SIGNIFICANT CHANGE UP (ref 80–94)
MONOCYTES # BLD AUTO: 0.44 K/UL — SIGNIFICANT CHANGE UP (ref 0.1–0.6)
MONOCYTES NFR BLD AUTO: 4.4 % — SIGNIFICANT CHANGE UP (ref 1.7–9.3)
NEUTROPHILS # BLD AUTO: 7.77 K/UL — HIGH (ref 1.4–6.5)
NEUTROPHILS NFR BLD AUTO: 77.2 % — HIGH (ref 42.2–75.2)
NITRITE UR-MCNC: NEGATIVE — SIGNIFICANT CHANGE UP
NRBC # BLD: 0 /100 WBCS — SIGNIFICANT CHANGE UP (ref 0–0)
PH UR: 7 — SIGNIFICANT CHANGE UP (ref 5–8)
PLATELET # BLD AUTO: 297 K/UL — SIGNIFICANT CHANGE UP (ref 130–400)
POTASSIUM SERPL-MCNC: 4.4 MMOL/L — SIGNIFICANT CHANGE UP (ref 3.5–5)
POTASSIUM SERPL-SCNC: 4.4 MMOL/L — SIGNIFICANT CHANGE UP (ref 3.5–5)
PROT SERPL-MCNC: 7.7 G/DL — SIGNIFICANT CHANGE UP (ref 6–8)
PROT UR-MCNC: SIGNIFICANT CHANGE UP
RBC # BLD: 5.49 M/UL — SIGNIFICANT CHANGE UP (ref 4.7–6.1)
RBC # FLD: 12.2 % — SIGNIFICANT CHANGE UP (ref 11.5–14.5)
RSV RNA NPH QL NAA+NON-PROBE: SIGNIFICANT CHANGE UP
SARS-COV-2 RNA SPEC QL NAA+PROBE: SIGNIFICANT CHANGE UP
SODIUM SERPL-SCNC: 140 MMOL/L — SIGNIFICANT CHANGE UP (ref 135–146)
SP GR SPEC: 1.02 — SIGNIFICANT CHANGE UP (ref 1.01–1.03)
UROBILINOGEN FLD QL: SIGNIFICANT CHANGE UP
WBC # BLD: 10.06 K/UL — SIGNIFICANT CHANGE UP (ref 4.8–10.8)
WBC # FLD AUTO: 10.06 K/UL — SIGNIFICANT CHANGE UP (ref 4.8–10.8)

## 2022-12-07 PROCEDURE — 99285 EMERGENCY DEPT VISIT HI MDM: CPT

## 2022-12-07 PROCEDURE — 76870 US EXAM SCROTUM: CPT | Mod: 26

## 2022-12-07 PROCEDURE — 74177 CT ABD & PELVIS W/CONTRAST: CPT | Mod: 26,MA

## 2022-12-07 RX ORDER — TIZANIDINE 4 MG/1
2 TABLET ORAL
Qty: 120 | Refills: 0
Start: 2022-12-07 | End: 2022-12-26

## 2022-12-07 RX ORDER — MECLIZINE HCL 12.5 MG
1 TABLET ORAL
Qty: 21 | Refills: 0
Start: 2022-12-07 | End: 2022-12-13

## 2022-12-07 RX ORDER — SODIUM CHLORIDE 9 MG/ML
1000 INJECTION INTRAMUSCULAR; INTRAVENOUS; SUBCUTANEOUS ONCE
Refills: 0 | Status: COMPLETED | OUTPATIENT
Start: 2022-12-07 | End: 2022-12-07

## 2022-12-07 RX ADMIN — SODIUM CHLORIDE 1000 MILLILITER(S): 9 INJECTION INTRAMUSCULAR; INTRAVENOUS; SUBCUTANEOUS at 13:59

## 2022-12-07 NOTE — ED PROVIDER NOTE - CARE PROVIDER_API CALL
Sharan Jones)  EEGEpilepsy; Neurology  [address], [address]  [place] [address]  Phone: [phone]  Fax: [phone]  Follow Up Time: 1-3 Days    Geo Piedra)  [address] [place] [address]  Phone: [phone]  Fax: [phone]  Follow Up Time: 1-3 Days

## 2022-12-07 NOTE — ED PROVIDER NOTE - CARE PLAN
1 Principal Discharge DX:	Abdominal pain  Secondary Diagnosis:	Testicular pain  Secondary Diagnosis:	Dizziness

## 2022-12-07 NOTE — ED PROVIDER NOTE - NSFOLLOWUPINSTRUCTIONS_ED_ALL_ED_FT
Our Emergency Department Referral Coordinators will be reaching out to you in the next 24-48 hours from 9:00am to 5:00pm (Monday - Friday) with a follow up appointment. Please expect a phone call from the hospital in that time frame. If you do not receive a call or if you have any questions or concerns, you can reach them at [phone] or [phone].      Abdominal Pain    Many things can cause abdominal pain. Usually, abdominal pain is not caused by a disease and will improve without treatment. Your health care provider will do a physical exam and possibly order blood tests and imaging to help determine the seriousness of your pain. However, in many cases, no cause may be found and you may need further testing as an outpatient. Monitor your abdominal pain for any changes.     SEEK IMMEDIATE MEDICAL CARE IF YOU HAVE THE FOLLOWING SYMPTOMS: worsening abdominal pain, vomiting, diarrhea, inability to have bowel movements or pass gas, black or bloody stool, fever accompanying chest pain or back pain, or dizziness/lightheadedness.    Dizziness    Dizziness is a common problem. It is a feeling of unsteadiness or light-headedness. You may feel like you are about to faint. This condition can be caused by a number of things, including medicines, dehydration, or illness. Drink enough fluid to keep your urine clear or pale yellow. Do not drink alcohol and limit your caffeine and salt intake. Avoid quick movement.  Rise slowly from chairs and steady yourself until you feel okay. In the morning, first sit up on the side of the bed.    SEEK IMMEDIATE MEDICAL CARE IF YOU HAVE THE FOLLOWING SYMPTOMS: vomiting, changes in your vision or speech, weakness in your arms or legs, trouble speaking or swallowing, chest pain, abdominal pain, shortness of breath, sweating, bleeding, headache, neck pain, or fever.

## 2022-12-07 NOTE — ED PROVIDER NOTE - PROVIDER TOKENS
PROVIDER:[TOKEN:[[id number]],FOLLOWUP:[1-3 Days]],PROVIDER:[TOKEN:[[id number]],FOLLOWUP:[1-3 Days]]

## 2022-12-07 NOTE — ED PROVIDER NOTE - CLINICAL SUMMARY MEDICAL DECISION MAKING FREE TEXT BOX
Patient signed out to me from Dr. Du, 47-year-old man with c/o L sided abdominal pain, as well as L testicular pain going on for few months.  Labs reviewed: WBC/CMP unremarkable, lipase negative, UA negative.  CT abdomen with diverticulosis, no diverticulitis, no acute abdominal pathology.  Testicular sonogram with no torsion/epididymoorchitis, + trace bilateral hydroceles.  Results of labs and imaging discussed with patient, patient well-appearing in ER.  To DC home, patient to follow-up with GI as well as urology.  Told to return to ER if he feels worse, or for any new/concerning symptoms.  Patient understands and agrees with plan.

## 2022-12-07 NOTE — ED PROVIDER NOTE - PATIENT PORTAL LINK FT
You can access the FollowMyHealth Patient Portal offered by [facility] by registering at the following website: [email or web address]. By joining [employer]’s FollowMyHealth portal, you will also be able to view your health information using other applications (apps) compatible with our system.

## 2022-12-07 NOTE — ED PROVIDER NOTE - CARE PROVIDERS DIRECT ADDRESSES
[email or web address] the [email or web address]

## 2022-12-07 NOTE — ED PROVIDER NOTE - OBJECTIVE STATEMENT
48 yo male with a pmh of diverticulitis presents c/o weakness/nausea/LUQ abdominal pain for 2 days. pt describes the pain as a "spasm" and notes it to be intermittent. pt denies any urinary or bowel changes. pt denies any other symptoms including fevers, chill, headache, recent illness/travel, cough, chest pain, or SOB.

## 2022-12-07 NOTE — ED PROVIDER NOTE - ATTENDING APP SHARED VISIT CONTRIBUTION OF CARE
48 yo m with pmh of diverticulitis, presents with 2 days of LUQ pain.  pt says different than his pain from diverticulitis.  but he "just doesn't feel right".  pt admits feels like spasm.  denies flank pain.  denies urinary sx.  pt with some hesitation admits to L testicular pain and swelling.  pt says has been going on for a few months and had US here that was normal.  pt never f/u with urology.  denies fever or chills.  no n/v/d.  no penile dc or rash.  exam: nad, ncat, perrl, eomi, mmm, rrr, ctab, abd soft, nt, nd aox3, : b/l testicles= normal lie, +cremasteric reflex.  L testicle mildly ttp, no erythema, uncirc, no penile dc or rash imp: pt with LUQ pain, will check labs, and ct.  pt's testicular history was illicited after initial labs and ct was done.  us added on

## 2022-12-09 LAB
CULTURE RESULTS: SIGNIFICANT CHANGE UP
SPECIMEN SOURCE: SIGNIFICANT CHANGE UP

## 2023-01-18 ENCOUNTER — APPOINTMENT (OUTPATIENT)
Dept: PULMONOLOGY | Facility: CLINIC | Age: 48
End: 2023-01-18

## 2023-06-12 NOTE — ED ADULT NURSE NOTE - MUSCULOSKELETAL ASSESSMENT
You must understand that you have received treatment at an Urgent Care facility only, and that you may be  released before all of your medical problems are known or treated. Urgent Care facilities are not equipped to  handle life threatening emergencies. It is recommended that you seek care at an Emergency Department for  further evaluation of worsening or concerning symptoms, or possibly life threatening conditions as  discussed.     WDL

## 2023-07-18 NOTE — ED ADULT TRIAGE NOTE - NS ED NOTE AC HIGH RISK COUNTRIES
Standing/Walking/Toileting/Moving from bed to stretcher
No
Pt reports good appetite and PO intake at home, states her appetite has always been great. Confirms NKFA. Pt reports not following any type of diet or restriction at home; states cooking healthy meals. Reports taking Vitamin E PTA; denies drinking any nutritional supplement. Pt denies history of DM or pre-DM; last HbA1c (01/10/2018) 5.9% - suggested new HbA1c.

## 2023-12-25 NOTE — ED ADULT TRIAGE NOTE - NS ED TRIAGE AVPU SCALE
Reason for Admission   You came to the ER with worsening depression and developing suicidal ideation.    Major Procedures and Tests  Imaging Results  No results found.    No Surgical Procedures    Advance Directives  Does patient have advance directive?: No  Patient does not have Advance Directive: Patient/Family declines further information  Does patient have current OOH DNR form?: No  Patient does not have current OOH DNR form: Patient/Family declines further information  Does patient have current POLST?: No  Patient does not have current POLST: Patient/Family declines further information  Does patient have mental health advance directive?: No  Patient does not have Mental Health Advance Directive: Patient/Family declines further information  
Alert-The patient is alert, awake and responds to voice. The patient is oriented to time, place, and person. The triage nurse is able to obtain subjective information.
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
## 2025-07-10 NOTE — ED ADULT NURSE NOTE - NS PRO AD NO ADVANCE DIRECTIVE
As per our conversation dtr wants the steroid called in for her mother's hands. (Hands hurting) She was informed x-rays not read and will need to see ortho or hand specialist. .    No